# Patient Record
Sex: MALE | Race: WHITE | Employment: OTHER | URBAN - METROPOLITAN AREA
[De-identification: names, ages, dates, MRNs, and addresses within clinical notes are randomized per-mention and may not be internally consistent; named-entity substitution may affect disease eponyms.]

---

## 2017-08-31 ENCOUNTER — OFFICE VISIT (OUTPATIENT)
Dept: FAMILY MEDICINE CLINIC | Age: 42
End: 2017-08-31

## 2017-08-31 VITALS
HEART RATE: 77 BPM | HEIGHT: 72 IN | WEIGHT: 220 LBS | OXYGEN SATURATION: 98 % | TEMPERATURE: 98 F | DIASTOLIC BLOOD PRESSURE: 87 MMHG | BODY MASS INDEX: 29.8 KG/M2 | SYSTOLIC BLOOD PRESSURE: 134 MMHG | RESPIRATION RATE: 14 BRPM

## 2017-08-31 DIAGNOSIS — Z00.00 ANNUAL PHYSICAL EXAM: Primary | ICD-10-CM

## 2017-08-31 DIAGNOSIS — Z12.5 SCREENING FOR PROSTATE CANCER: ICD-10-CM

## 2017-08-31 DIAGNOSIS — R29.810 FACIAL DROOP: ICD-10-CM

## 2017-08-31 NOTE — PROGRESS NOTES
Chief Complaint   Patient presents with    Physical     Patient is here for a physical exam and fasting labs today.    Health Maintenance reviewed

## 2017-08-31 NOTE — PROGRESS NOTES
Og Gtz 42 y.o.. male presents for annual exam.      Screening:   Prostate cancer: desires PSA  Colon cancer: not due, no family history of colon cancer    Chronic issues:   has a past medical history of Allergic rhinitis, cause unspecified (2/19/2010); Elevated blood pressure (not hypertension) (2/19/2010); Eye muscle twitches; Ill-defined condition; Lumbar back pain; Post-concussion syndrome (2/19/2010); and Syncope and collapse (2/19/2010). Facial droop: Patient requests referral to neurology. Has been seen and evaluated at [facility] for facial droop and was receiving Botox injections through neurology; would like to have a [facility] physician take over and perform Botox injections for him. Immunizations:   Flu: declines  TdaP: had in the last year. Diet & Exercise:  Diet: no regular diet. Trying to eat healthily  Exercise: No regular exercise. Up on feet, moving around daily at work. Allergies   Allergen Reactions    Pcn [Penicillins] Rash       Current Outpatient Prescriptions:     valACYclovir (VALTREX) 500 mg tablet, Take 1 Tab by mouth daily. , Disp: 30 Tab, Rfl: 5      ROS  Gen: denies fever, chills, fatigue, weight loss, weight gain  Eyes: denies blurry vision  Nose: denies nasal congestion  Mouth: denies sore throat  Resp: denies dypsnea, cough, wheezing  CV: denies chest pain, palpitations, lower extremity edema  Abd: denies nausea, vomiting, diarrhea, constipation  Neuro: denies numbness/tingling    Visit Vitals    /87 (BP 1 Location: Left arm, BP Patient Position: Sitting)    Pulse 77    Temp 98 °F (36.7 °C) (Oral)    Resp 14    Ht 6' (1.829 m)    Wt 220 lb (99.8 kg)    SpO2 98%    BMI 29.84 kg/m2     Gen: alert, oriented, no acute distress  Head: NCAT  Eyes: PERRLA, sclera clear, conjunctiva clear  Nose: normal turbinates, no rhinorrhea, no sinus tenderness  Oral: moist mucus membranes, no oral lesions, no pharyngeal inflammation or exudate  Neck: supple, midline trachea, no retractions. Thyroid WNL. Resp: Lungs CTAB, no wheezing, rales or rhonchi  CV: Normal rhythm, normal S1/S2, no m/r/g. No LE edema  Skin: no lesion or rash  Neuro: no facial droop noted. Assessment/ Plan:   Diagnoses and all orders for this visit:    1. Annual physical exam  -     CA HANDLG&/OR CONVEY OF SPEC FOR TR OFFICE TO LAB  -     CA COLLECTION VENOUS BLOOD,VENIPUNCTURE  -     LIPID PANEL  -     METABOLIC PANEL, COMPREHENSIVE  -     CBC WITH AUTOMATED DIFF    2. Facial droop  -     REFERRAL TO NEUROLOGY    3. Screening for prostate cancer  -     PSA TOTAL (REFLEX TO FREE)        Healthy balanced diet with fruits, vegetables, lean meats and low in carbohydrates, saturated fats, processed foods recommended. Recommended 30 minutes cardiovascular exercise such as brisk walking/running at leas 3-5x a week. Health Maintenance reviewed - labs ordered. Verbal and written instructions (see AVS) provided.  Patient expresses understanding of diagnosis and treatment plan. Neymar Julio.  Brenda Reina MD

## 2017-09-01 LAB
ALBUMIN SERPL-MCNC: 4.2 G/DL (ref 3.5–5.5)
ALBUMIN/GLOB SERPL: 1.6 {RATIO} (ref 1.2–2.2)
ALP SERPL-CCNC: 56 IU/L (ref 39–117)
ALT SERPL-CCNC: 14 IU/L (ref 0–44)
AST SERPL-CCNC: 13 IU/L (ref 0–40)
BASOPHILS # BLD AUTO: 0 X10E3/UL (ref 0–0.2)
BASOPHILS NFR BLD AUTO: 1 %
BILIRUB SERPL-MCNC: 0.4 MG/DL (ref 0–1.2)
BUN SERPL-MCNC: 14 MG/DL (ref 6–24)
BUN/CREAT SERPL: 17 (ref 9–20)
CALCIUM SERPL-MCNC: 9.6 MG/DL (ref 8.7–10.2)
CHLORIDE SERPL-SCNC: 100 MMOL/L (ref 96–106)
CHOLEST SERPL-MCNC: 158 MG/DL (ref 100–199)
CO2 SERPL-SCNC: 26 MMOL/L (ref 18–29)
CREAT SERPL-MCNC: 0.82 MG/DL (ref 0.76–1.27)
EOSINOPHIL # BLD AUTO: 0.1 X10E3/UL (ref 0–0.4)
EOSINOPHIL NFR BLD AUTO: 1 %
ERYTHROCYTE [DISTWIDTH] IN BLOOD BY AUTOMATED COUNT: 13.8 % (ref 12.3–15.4)
GLOBULIN SER CALC-MCNC: 2.6 G/DL (ref 1.5–4.5)
GLUCOSE SERPL-MCNC: 89 MG/DL (ref 65–99)
HCT VFR BLD AUTO: 45.1 % (ref 37.5–51)
HDLC SERPL-MCNC: 64 MG/DL
HGB BLD-MCNC: 15 G/DL (ref 12.6–17.7)
IMM GRANULOCYTES # BLD: 0 X10E3/UL (ref 0–0.1)
IMM GRANULOCYTES NFR BLD: 0 %
INTERPRETATION, 910389: NORMAL
LDLC SERPL CALC-MCNC: 82 MG/DL (ref 0–99)
LYMPHOCYTES # BLD AUTO: 1.9 X10E3/UL (ref 0.7–3.1)
LYMPHOCYTES NFR BLD AUTO: 34 %
MCH RBC QN AUTO: 29.1 PG (ref 26.6–33)
MCHC RBC AUTO-ENTMCNC: 33.3 G/DL (ref 31.5–35.7)
MCV RBC AUTO: 88 FL (ref 79–97)
MONOCYTES # BLD AUTO: 0.7 X10E3/UL (ref 0.1–0.9)
MONOCYTES NFR BLD AUTO: 12 %
NEUTROPHILS # BLD AUTO: 3 X10E3/UL (ref 1.4–7)
NEUTROPHILS NFR BLD AUTO: 52 %
PLATELET # BLD AUTO: 257 X10E3/UL (ref 150–379)
POTASSIUM SERPL-SCNC: 4.9 MMOL/L (ref 3.5–5.2)
PROT SERPL-MCNC: 6.8 G/DL (ref 6–8.5)
PSA SERPL-MCNC: 0.3 NG/ML (ref 0–4)
RBC # BLD AUTO: 5.15 X10E6/UL (ref 4.14–5.8)
REFLEX CRITERIA: NORMAL
SODIUM SERPL-SCNC: 140 MMOL/L (ref 134–144)
TRIGL SERPL-MCNC: 62 MG/DL (ref 0–149)
VLDLC SERPL CALC-MCNC: 12 MG/DL (ref 5–40)
WBC # BLD AUTO: 5.6 X10E3/UL (ref 3.4–10.8)

## 2017-09-06 ENCOUNTER — TELEPHONE (OUTPATIENT)
Dept: FAMILY MEDICINE CLINIC | Age: 42
End: 2017-09-06

## 2017-09-12 ENCOUNTER — APPOINTMENT (OUTPATIENT)
Dept: GENERAL RADIOLOGY | Age: 42
End: 2017-09-12
Attending: EMERGENCY MEDICINE
Payer: OTHER MISCELLANEOUS

## 2017-09-12 ENCOUNTER — APPOINTMENT (OUTPATIENT)
Dept: CT IMAGING | Age: 42
End: 2017-09-12
Attending: EMERGENCY MEDICINE
Payer: OTHER MISCELLANEOUS

## 2017-09-12 ENCOUNTER — HOSPITAL ENCOUNTER (EMERGENCY)
Age: 42
Discharge: HOME | End: 2017-09-12
Attending: EMERGENCY MEDICINE
Payer: OTHER MISCELLANEOUS

## 2017-09-12 VITALS
SYSTOLIC BLOOD PRESSURE: 153 MMHG | WEIGHT: 220.24 LBS | RESPIRATION RATE: 16 BRPM | HEIGHT: 72 IN | DIASTOLIC BLOOD PRESSURE: 108 MMHG | OXYGEN SATURATION: 92 % | BODY MASS INDEX: 29.83 KG/M2

## 2017-09-12 DIAGNOSIS — S06.0X0A CONCUSSION, WITHOUT LOC, INITIAL ENCOUNTER: Primary | ICD-10-CM

## 2017-09-12 DIAGNOSIS — M54.50 ACUTE MIDLINE LOW BACK PAIN WITHOUT SCIATICA: ICD-10-CM

## 2017-09-12 DIAGNOSIS — S01.01XA SCALP LACERATION, INITIAL ENCOUNTER: ICD-10-CM
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
## 2017-09-12 PROCEDURE — 72100 X-RAY EXAM L-S SPINE 2/3 VWS: CPT

## 2017-09-12 PROCEDURE — 70450 CT HEAD/BRAIN W/O DYE: CPT

## 2017-09-12 PROCEDURE — 74011250637 HC RX REV CODE- 250/637: Performed by: PHYSICIAN ASSISTANT

## 2017-09-12 PROCEDURE — 99283 EMERGENCY DEPT VISIT LOW MDM: CPT

## 2017-09-12 RX ORDER — OXYCODONE AND ACETAMINOPHEN 5; 325 MG/1; MG/1
1 TABLET ORAL
Qty: 15 TAB | Refills: 0 | Status: SHIPPED | OUTPATIENT
Start: 2017-09-12 | End: 2017-09-15

## 2017-09-12 RX ORDER — IBUPROFEN 600 MG/1
600 TABLET ORAL
Status: COMPLETED | OUTPATIENT
Start: 2017-09-12 | End: 2017-09-12

## 2017-09-12 RX ORDER — CYCLOBENZAPRINE HCL 10 MG
10 TABLET ORAL
Qty: 20 TAB | Refills: 0 | Status: SHIPPED | OUTPATIENT
Start: 2017-09-12 | End: 2018-10-22 | Stop reason: ALTCHOICE

## 2017-09-12 RX ORDER — DIAZEPAM 5 MG/1
5 TABLET ORAL
Status: COMPLETED | OUTPATIENT
Start: 2017-09-12 | End: 2017-09-12

## 2017-09-12 RX ORDER — IBUPROFEN 600 MG/1
600 TABLET ORAL
Qty: 20 TAB | Refills: 0 | Status: SHIPPED | OUTPATIENT
Start: 2017-09-12 | End: 2018-10-22 | Stop reason: ALTCHOICE

## 2017-09-12 RX ORDER — OXYCODONE AND ACETAMINOPHEN 5; 325 MG/1; MG/1
1 TABLET ORAL
Status: COMPLETED | OUTPATIENT
Start: 2017-09-12 | End: 2017-09-12

## 2017-09-12 RX ADMIN — DIAZEPAM 5 MG: 5 TABLET ORAL at 18:58

## 2017-09-12 RX ADMIN — OXYCODONE AND ACETAMINOPHEN 1 TABLET: 5; 325 TABLET ORAL at 18:58

## 2017-09-12 RX ADMIN — IBUPROFEN 600 MG: 600 TABLET, FILM COATED ORAL at 18:58

## 2017-09-12 NOTE — ED PROVIDER NOTES
HPI Comments: Everette Rodriges, 42 y.o. male with significant PMHx for HTN, presents ambulatory to ED [facility] ED with cc of back pain, headache, and head laceration secondary to a fall that occurred earlier this afternoon. Pt states he is a  and fell from a metal car lift at 3 feet. Pt notes his head hit the car lift beside him first, then he proceeded to fall to the ground. Pt denies any LOC. Pt's head laceration had controlled bleeding PTA. Pt states he has lower back pain and a slight headache at this time. Of note, pt had a herniated disc several years ago and received cortisol treatments for a time. Pt has not taken any medications for pain. Patient specifically denies nausea, vomiting, diarrhea, abdominal pain, chest pain, SOB, neck pain, urinary or fecal incontinence, numbness, lightheadedness, or dizziness. PCP: Faustino Pena MD    Social history significant for: - Tobacco, - EtOH, - Illicit Drug Use    There are no other complaints, changes, or physical findings at this time. The history is provided by the patient. No  was used. Past Medical History:   Diagnosis Date    Allergic rhinitis, cause unspecified 2/19/2010    Elevated blood pressure (not hypertension) 2/19/2010    Eye muscle twitches     right eye, seeing specialist    Ill-defined condition     kidney stone    Lumbar back pain     L4-L5 injury, injected    Post-concussion syndrome 2/19/2010    Syncope and collapse 2/19/2010       Past Surgical History:   Procedure Laterality Date    HX OTHER SURGICAL      wisdom teeth extraction    HX SEPTOPLASTY  2016         Family History:   Problem Relation Age of Onset    Diabetes Maternal Uncle     Hypertension Mother        Social History     Social History    Marital status:      Spouse name: N/A    Number of children: N/A    Years of education: N/A     Occupational History    Not on file.      Social History Main Topics    Smoking status: Never Smoker    Smokeless tobacco: Former User      Comment: no vaping    Alcohol use 1.0 oz/week     2 Cans of beer per week    Drug use: No    Sexual activity: Yes     Other Topics Concern    Not on file     Social History Narrative    , 3 children         ALLERGIES: Pcn [penicillins]    Review of Systems   Constitutional: Negative for chills, diaphoresis and fever. HENT: Negative for rhinorrhea and sore throat. Eyes: Negative for pain. Respiratory: Negative for shortness of breath, wheezing and stridor. Cardiovascular: Negative for chest pain and leg swelling. Gastrointestinal: Negative for abdominal pain, diarrhea, nausea and vomiting. Genitourinary: Negative for difficulty urinating, dysuria, flank pain and hematuria. Musculoskeletal: Positive for back pain. Negative for neck pain and neck stiffness. Skin: Positive for wound. Negative for rash. Neurological: Positive for headaches. Negative for dizziness, seizures, syncope, weakness and light-headedness. Psychiatric/Behavioral: Negative for behavioral problems and confusion. Vitals:    09/12/17 1622   BP: (!) 153/108   Resp: 16   SpO2: 92%   Weight: 99.9 kg (220 lb 3.8 oz)   Height: 6' (1.829 m)            Physical Exam   Constitutional: He is oriented to person, place, and time. He appears well-developed and well-nourished. No distress. Patient is a  M, awake and alert in NAD. HENT:   Head: Normocephalic. Right Ear: External ear normal.   Left Ear: External ear normal.   Nose: Nose normal.   2mm laceration to occipital scalp. Scant active bleeding. No surrounding erythema or edema. No crepitus with palpation. Eyes: Conjunctivae and EOM are normal. Pupils are equal, round, and reactive to light. Right eye exhibits no discharge. Left eye exhibits no discharge. No scleral icterus. Neck: Normal range of motion. Neck supple. Cardiovascular: Normal rate, regular rhythm and normal heart sounds.     No murmur heard.  Pulmonary/Chest: Effort normal and breath sounds normal. No stridor. No respiratory distress. He has no decreased breath sounds. He has no wheezes. Abdominal: Soft. Bowel sounds are normal. He exhibits no distension. There is no tenderness. There is no rebound. Musculoskeletal: He exhibits tenderness. He exhibits no edema. Spine: No edema, erythema, step offs, or obvious bony deformity. Lower lumbar tenderness   Neurological: He is alert and oriented to person, place, and time. Coordination normal. GCS eye subscore is 4. GCS verbal subscore is 5. GCS motor subscore is 6. No focal neuro deficits. Neuro of upper extremities intact b/l. Neuro of  Lower extremities intact b/l. Sensation of upper and lower extremities intact b/l. Strength 5/5 of upper extremities b/l. Strength 5/5 of lower extremities b/l. Neg Romberg, FTN intact. Ambulatory with steady gait, without difficulty or assistance. No foot drop appreciated. Skin: Skin is warm and dry. No rash noted. He is not diaphoretic.   1 mm laceration to posterior head  CR < 2 seconds  NVI with intact distal sensation  No pulsatile flow, pallor, or edema noted  No active bleeding    Psychiatric: He has a normal mood and affect. Judgment normal.   Nursing note and vitals reviewed. MDM  Number of Diagnoses or Management Options  Acute midline low back pain without sciatica:   Concussion, without LOC, initial encounter:   Fall, initial encounter:   Scalp laceration, initial encounter:   Diagnosis management comments:   DDx: concussion, closed head injury, laceration    strain, sprain, fracture, contusion, acute exacerbation of chronic low back pain    fall       Amount and/or Complexity of Data Reviewed  Tests in the radiology section of CPT®: ordered and reviewed  Review and summarize past medical records: yes    Patient Progress  Patient progress: stable    ED Course       Procedures    Progress Note:  6:49 PM  Updated on CT results.  XR still pending. Discussed head injury precautions with patient. Pt will be monitored by wife at home. Written by Jessika Cohn, ED Scribe, as dictated by Balbir Douglass PA-C     Progress Note:  7:00 PM  Pt declines a staple due to size of the laceration. There is a scant amount of blood, well approximated. Written by Jessika Cohn, ED Scribe, as dictated by Balbir Douglass PA-C    7:02PM  Provider re-evaluated pt. Provider discussed all available diagnostics, diagnosis, and treatment plan. Thoroughly discussed worrisome signs/symptoms in which pt should immediately return to ED, otherwise follow up with PCP, ortho, and/or neuro. Patient conveys understanding and agreement to all of the above. All patient's questions were answered by provider. Patient ambulatory out of ED. JUANCARLOS Lee      LABORATORY TESTS:  No results found for this or any previous visit (from the past 12 hour(s)). IMAGING RESULTS:  XR SPINE LUMB 2 OR 3 V   Final Result   Lumbar. 9/12/2017 6:37 PM     INDICATION: fall from a lift, roughly 4-5 feet, landed flat on back lumbar and  buttock pain.     COMPARISON: None.     FINDINGS:  Frontal, lateral, and lumbosacral radiographs. Lumbar alignment and vertebral  body heights are normal. Incidental note is made of vestigial L1 ribs. Degenerative disc disease is minimal. L5-S1 facet osteoarthritis is mild.     IMPRESSION  IMPRESSION:  Normal alignment and vertebral body heights. CT HEAD WO CONT   Final Result   HEAD CT WITHOUT CONTRAST: 9/12/2017 5:39 PM     INDICATION: Head injury mild or moderate acute, no neurological deficit. Alley Ward  off a lift, hitting head on the side. Posterior head laceration.     COMPARISON: None.     PROCEDURE: Axial images of the head were obtained without contrast. Coronal and  sagittal reformats were performed.  CT dose reduction was achieved through use of  a standardized protocol tailored for this examination and automatic exposure  control for dose modulation.     FINDINGS: The ventricles and sulci are appropriate in size and configuration for  age. No loss of gray-white differentiation to suggest late acute or early  subacute infarction. No mass effect or intracranial hemorrhage.     IMPRESSION  IMPRESSION: No acute intracranial abnormality. MEDICATIONS GIVEN:  Medications   oxyCODONE-acetaminophen (PERCOCET) 5-325 mg per tablet 1 Tab (1 Tab Oral Given 9/12/17 1858)   diazePAM (VALIUM) tablet 5 mg (5 mg Oral Given 9/12/17 1858)   ibuprofen (MOTRIN) tablet 600 mg (600 mg Oral Given 9/12/17 1858)       IMPRESSION:  1. Concussion, without LOC, initial encounter    2. Acute midline low back pain without sciatica    3. Scalp laceration, initial encounter    4. Fall, initial encounter        PLAN:  1. Current Discharge Medication List      START taking these medications    Details   cyclobenzaprine (FLEXERIL) 10 mg tablet Take 1 Tab by mouth three (3) times daily as needed for Muscle Spasm(s). Qty: 20 Tab, Refills: 0      oxyCODONE-acetaminophen (PERCOCET) 5-325 mg per tablet Take 1 Tab by mouth every six (6) hours as needed for Pain for up to 3 days. Max Daily Amount: 4 Tabs. Qty: 15 Tab, Refills: 0      ibuprofen (MOTRIN) 600 mg tablet Take 1 Tab by mouth every six (6) hours as needed for Pain. Qty: 20 Tab, Refills: 0           2.    Follow-up Information     Follow up With Details Comments Contact Info    Kathrin Coello MD Schedule an appointment as soon as possible for a visit in 1 day  [address]  [address]      Dallas Hernandez MD Schedule an appointment as soon as possible for a visit in 4 days  [address] Leroy Ocasio MD Schedule an appointment as soon as possible for a visit in 4 days As needed [address]   MOB [address] [facility]  [phone]      [facility] EMERGENCY DEPT  As needed or, If symptoms worsen [address] [facility] [place]  [phone]        Return to ED if worse     Discharge Note:  7:02 PM   The pt is ready for discharge. The pt's signs, symptoms, diagnosis, and discharge instructions have been discussed and pt has conveyed their understanding. The pt is to follow up as recommended or return to ER should their symptoms worsen. Plan has been discussed and pt is in agreement. This note is prepared by [facility], acting as a Scribe for Juan Antonio Reyes PA-C. Juan Antonio Reyes PA-C: The scribe's documentation has been prepared under my direction and personally reviewed by me in its entirety. I confirm that the notes above accurately reflects all work, treatment, procedures, and medical decision making performed by me. This note will not be viewable in [address]

## 2017-09-12 NOTE — ED TRIAGE NOTES
Patient reports that his fall was from about 4-5 feet and he landed flat on his back. Denies neck pain, non-tender to palpation.

## 2017-09-12 NOTE — DISCHARGE INSTRUCTIONS
Concussion: Care Instructions  Your Care Instructions    A concussion is a kind of injury to the brain. It happens when the head receives a hard blow. The impact can jar or shake the brain against the skull. This interrupts the brain's normal activities. Although you may have cuts or bruises on your head or face, you may have no other visible signs of a brain injury. In most cases, damage to the brain from a concussion can't be seen in tests such as a CT or MRI scan. For a few weeks, you may have low energy, dizziness, trouble sleeping, a headache, ringing in your ears, or nausea. You may also feel anxious, grumpy, or depressed. You may have problems with memory and concentration. These symptoms are common after a concussion. They should slowly improve over time. Sometimes this takes weeks or even months. Someone who lives with you should know how to care for you. Please share this and all information with a caregiver who will be available to help if needed. Follow-up care is a key part of your treatment and safety. Be sure to make and go to all appointments, and call your doctor if you are having problems. It's also a good idea to know your test results and keep a list of the medicines you take. How can you care for yourself at home? Pain control  · Put ice or a cold pack on the part of your head that hurts for 10 to 20 minutes at a time. Put a thin cloth between the ice and your skin. · Be safe with medicines. Read and follow all instructions on the label. ¨ If the doctor gave you a prescription medicine for pain, take it as prescribed. ¨ If you are not taking a prescription pain medicine, ask your doctor if you can take an over-the-counter medicine. Recovery  · Follow your doctor's instructions. He or she will tell you if you need someone to watch you closely for the next 24 hours or longer. · Rest is the best way to recover from a concussion.  You need to rest your body and your brain:  ¨ Get plenty of sleep at night. And take rest breaks during the day. ¨ Avoid activities that take a lot of physical or mental work. This includes housework, exercise, schoolwork, video games, text messaging, and using the computer. ¨ You may need to change your school or work schedule while you recover. ¨ Return to your normal activities slowly. Do not try to do too much at once. · Do not drink alcohol or use illegal drugs. Alcohol and illegal drugs can slow your recovery. And they can increase your risk of a second brain injury. · Avoid activities that could lead to another concussion. Follow your doctor's instructions for a gradual return to activity and sports. · Ask your doctor when it's okay for you to drive a car, ride a bike, or operate machinery. How should you return to activity? Your return to sports or activity should be gradual. It should only begin when all symptoms of a concussion are gone, both while at rest and during exercise or exertion. Doctors and concussion specialists suggest steps to follow for returning to sports after a concussion. Use these steps as a guide. In most places, your doctor must give you written permission for your child to begin the steps and return to sports. You should slowly progress through the following levels of activity:  1. No activity. This means complete physical and mental rest.  2. Light aerobic activity. This can include walking, swimming, or other exercise at less than 70% of maximum heart rate. No resistance training is included in this step. 3. Sport-specific exercise. This includes running drills or skating drills (depending on the sport), but no head impact. 4. Noncontact training drills. This includes more complex training drills such as passing. The athlete may also begin light resistance training. 5. Full-contact practice. The athlete can participate in normal training. 6. Return to normal game play.  This is the final step and allows the athlete to join in normal game play. Watch and keep track of your progress. It should take at least 6 days for you to go from light activity to normal game play. Make sure that you can stay at each new level of activity for at least 24 hours without symptoms, or as long as your doctor says, before doing more. If one or more symptoms come back, return to a lower level of activity for at least 24 hours. Don't move on until all symptoms are gone. When should you call for help? Call 911 anytime you think you may need emergency care. For example, call if:  · You have a seizure. · You passed out (lost consciousness). · You are confused or can't stay awake. Call your doctor now or seek immediate medical care if:  · You have new or worse vomiting. · You feel less alert. · You have new weakness or numbness in any part of your body. Watch closely for changes in your health, and be sure to contact your doctor if:  · You do not get better as expected. · You have new symptoms, such as headaches, trouble concentrating, or changes in mood. Where can you learn more? Go to http://lavon-lonny.info/. Enter Q395 in the search box to learn more about \"Concussion: Care Instructions. \"  Current as of: September 20, 2016  Content Version: 11.3  © 8678-5747 [employer]. Care instructions adapted under license by [employer] (which disclaims liability or warranty for this information). If you have questions about a medical condition or this instruction, always ask your healthcare professional. [place] [address] any warranty or liability for your use of this information. Back Pain: Care Instructions  Your Care Instructions    Back pain has many possible causes. It is often related to problems with muscles and ligaments of the back. It may also be related to problems with the nerves, discs, or bones of the back.  Moving, lifting, standing, sitting, or sleeping in an awkward way can strain the back. Sometimes you don't notice the injury until later. Arthritis is another common cause of back pain. Although it may hurt a lot, back pain usually improves on its own within several weeks. Most people recover in 12 weeks or less. Using good home treatment and being careful not to stress your back can help you feel better sooner. Follow-up care is a key part of your treatment and safety. Be sure to make and go to all appointments, and call your doctor if you are having problems. Its also a good idea to know your test results and keep a list of the medicines you take. How can you care for yourself at home? · Sit or lie in positions that are most comfortable and reduce your pain. Try one of these positions when you lie down:  ¨ Lie on your back with your knees bent and supported by large pillows. ¨ Lie on the floor with your legs on the seat of a sofa or chair. Rudolpho  on your side with your knees and hips bent and a pillow between your legs. ¨ Lie on your stomach if it does not make pain worse. · Do not sit up in bed, and avoid soft couches and twisted positions. Bed rest can help relieve pain at first, but it delays healing. Avoid bed rest after the first day of back pain. · Change positions every 30 minutes. If you must sit for long periods of time, take breaks from sitting. Get up and walk around, or lie in a comfortable position. · Try using a heating pad on a low or medium setting for 15 to 20 minutes every 2 or 3 hours. Try a warm shower in place of one session with the heating pad. · You can also try an ice pack for 10 to 15 minutes every 2 to 3 hours. Put a thin cloth between the ice pack and your skin. · Take pain medicines exactly as directed. ¨ If the doctor gave you a prescription medicine for pain, take it as prescribed. ¨ If you are not taking a prescription pain medicine, ask your doctor if you can take an over-the-counter medicine. · Take short walks several times a day. You can start with 5 to 10 minutes, 3 or 4 times a day, and work up to longer walks. Walk on level surfaces and avoid hills and stairs until your back is better. · Return to work and other activities as soon as you can. Continued rest without activity is usually not good for your back. · To prevent future back pain, do exercises to stretch and strengthen your back and stomach. Learn how to use good posture, safe lifting techniques, and proper body mechanics. When should you call for help? Call your doctor now or seek immediate medical care if:  · You have new or worsening numbness in your legs. · You have new or worsening weakness in your legs. (This could make it hard to stand up.)  · You lose control of your bladder or bowels. Watch closely for changes in your health, and be sure to contact your doctor if:  · Your pain gets worse. · You are not getting better after 2 weeks. Where can you learn more? Go to http://lavon-lonny.info/. Enter W115 in the search box to learn more about \"Back Pain: Care Instructions. \"  Current as of: March 21, 2017  Content Version: 11.3  © 7337-3155 [employer]. Care instructions adapted under license by [employer] (which disclaims liability or warranty for this information). If you have questions about a medical condition or this instruction, always ask your healthcare professional. [address] any warranty or liability for your use of this information.

## 2017-09-12 NOTE — ED TRIAGE NOTES
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 I hit my head on the other lift beside it and it put a good gash on my head and I just don't feel right. \" Patient denies LOC. He also complains of lumbar back pain and buttocks pain. 2x2mm lac to posterior head, bleeding controlled.

## 2017-09-12 NOTE — ED NOTES
The patient was given discharge instructions and questions were answered. Patient is in no apparent distress at time of discharge. Ambulatory with steady gait. Father present to drive him home.

## 2017-10-09 ENCOUNTER — DOCUMENTATION ONLY (OUTPATIENT)
Dept: NEUROLOGY | Age: 42
End: 2017-10-09

## 2017-10-09 ENCOUNTER — OFFICE VISIT (OUTPATIENT)
Dept: NEUROLOGY | Age: 42
End: 2017-10-09

## 2017-10-09 VITALS
HEIGHT: 72 IN | DIASTOLIC BLOOD PRESSURE: 84 MMHG | BODY MASS INDEX: 30.07 KG/M2 | OXYGEN SATURATION: 98 % | HEART RATE: 81 BPM | SYSTOLIC BLOOD PRESSURE: 122 MMHG | WEIGHT: 222 LBS

## 2017-10-09 DIAGNOSIS — G51.39 HEMIFACIAL SPASM: Primary | ICD-10-CM

## 2017-10-09 NOTE — PATIENT INSTRUCTIONS
[facility]   Statement to Patients  April 1, 2014      In an effort to ensure the large volume of patient prescription refills is processed in the most efficient and expeditious manner, we are asking our patients to assist us by calling your Pharmacy for all prescription refills, this will include also your  Mail Order Pharmacy. The pharmacy will contact our office electronically to continue the refill process. Please do not wait until the last minute to call your pharmacy. We need at least 48 hours (2days) to fill prescriptions. We also encourage you to call your pharmacy before going to  your prescription to make sure it is ready. With regard to controlled substance prescription refill requests (narcotic refills) that need to be picked up at our office, we ask your cooperation by providing us with at least 72 hours (3days) notice that you will need a refill. We will not refill narcotic prescription refill requests after 4:00pm on any weekday, Monday through Thursday, or after 2:00pm on Fridays, or on the weekends. We encourage everyone to explore another way of getting your prescription refill request processed using [employer], our patient web portal through our electronic medical record system. [employer] is an efficient and effective way to communicate your medication request directly to the office and  downloadable as an pablo on your smart phone . [employer] also features a review functionality that allows you to view your medication list as well as leave messages for your physician. Are you ready to get connected? If so please review the attatched instructions or speak to any of our staff to get you set up right away! Thank you so much for your cooperation. Should you have any questions please contact our Practice Administrator.     The Physicians and Staff,  [facility]          A Healthy Lifestyle: Care Instructions  Your Care Instructions  A healthy lifestyle can help you feel good, stay at a healthy weight, and have plenty of energy for both work and play. A healthy lifestyle is something you can share with your whole family. A healthy lifestyle also can lower your risk for serious health problems, such as high blood pressure, heart disease, and diabetes. You can follow a few steps listed below to improve your health and the health of your family. Follow-up care is a key part of your treatment and safety. Be sure to make and go to all appointments, and call your doctor if you are having problems. Its also a good idea to know your test results and keep a list of the medicines you take. How can you care for yourself at home? · Do not eat too much sugar, fat, or fast foods. You can still have dessert and treats now and then. The goal is moderation. · Start small to improve your eating habits. Pay attention to portion sizes, drink less juice and soda pop, and eat more fruits and vegetables. ¨ Eat a healthy amount of food. A 3-ounce serving of meat, for example, is about the size of a deck of cards. Fill the rest of your plate with vegetables and whole grains. ¨ Limit the amount of soda and sports drinks you have every day. Drink more water when you are thirsty. ¨ Eat at least 5 servings of fruits and vegetables every day. It may seem like a lot, but it is not hard to reach this goal. A serving or helping is 1 piece of fruit, 1 cup of vegetables, or 2 cups of leafy, raw vegetables. Have an apple or some carrot sticks as an afternoon snack instead of a candy bar. Try to have fruits and/or vegetables at every meal.  · Make exercise part of your daily routine. You may want to start with simple activities, such as walking, bicycling, or slow swimming. Try to be active 30 to 60 minutes every day. You do not need to do all 30 to 60 minutes all at once. For example, you can exercise 3 times a day for 10 or 20 minutes.  Moderate exercise is safe for most people, but it is always a good idea to talk to your doctor before starting an exercise program.  · Keep moving. Radha Zayas the lawn, work in the garden, or [employer] Take the stairs instead of the elevator at work. · If you smoke, quit. People who smoke have an increased risk for heart attack, stroke, cancer, and other lung illnesses. Quitting is hard, but there are ways to boost your chance of quitting tobacco for good. ¨ Use nicotine gum, patches, or lozenges. ¨ Ask your doctor about stop-smoking programs and medicines. ¨ Keep trying. In addition to reducing your risk of diseases in the future, you will notice some benefits soon after you stop using tobacco. If you have shortness of breath or asthma symptoms, they will likely get better within a few weeks after you quit. · Limit how much alcohol you drink. Moderate amounts of alcohol (up to 2 drinks a day for men, 1 drink a day for women) are okay. But drinking too much can lead to liver problems, high blood pressure, and other health problems. Family health  If you have a family, there are many things you can do together to improve your health. · Eat meals together as a family as often as possible. · Eat healthy foods. This includes fruits, vegetables, lean meats and dairy, and whole grains. · Include your family in your fitness plan. Most people think of activities such as jogging or tennis as the way to fitness, but there are many ways you and your family can be more active. Anything that makes you breathe hard and gets your heart pumping is exercise. Here are some tips:  ¨ Walk to do errands or to take your child to school or the bus. ¨ Go for a family bike ride after dinner instead of watching TV. Where can you learn more? Go to http://lavon-lonny.info/. Enter C993 in the search box to learn more about \"A Healthy Lifestyle: Care Instructions. \"  Current as of: July 26, 2016  Content Version: 11.3  © 4970-5207 [employer]. Care instructions adapted under license by [employer] (which disclaims liability or warranty for this information). If you have questions about a medical condition or this instruction, always ask your healthcare professional. [address] any warranty or liability for your use of this information.

## 2017-10-09 NOTE — LETTER
10/9/2017 8:58 AM 
 
Patient:  Kain Edgar YOB: 1975 Date of Visit: 10/9/2017 Dear Fawn Leon MD 
[address] [facility], [address] [place] [id number] VIA In Basket 
 : Thank you for referring Mr. Kasia Guerin to me for evaluation/treatment. Below are the relevant portions of my assessment and plan of care. HISTORY OF PRESENT ILLNESS Kain Edgar is a 36 y.o. male. HPI Comments: Kain Edgar is a 43-year-old  male who comes in today with a long history of right hemifacial spasm. He has had Botox injections before for it by a plastic surgeon who apparently injected into the right lower facial area as well. He has seen a neurosurgeon at [facility] who described the procedure to him and warned him off of it as rule as he did not think the problem was that severe. 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 At times when this thing gets worse he cannot sleep. He takes no regular medications he drinks 1-2 beers per week. He has no significant neurological family history. New Patient The history is provided by the patient. This is a chronic problem. Review of Systems HENT: Negative. Eyes: Positive for pain and redness. Respiratory: Negative. Cardiovascular: Negative. Gastrointestinal: Negative. Genitourinary: Negative. Musculoskeletal: Negative. Skin: Negative. Neurological: Negative. Endo/Heme/Allergies: Negative. Psychiatric/Behavioral: Negative. Current Outpatient Prescriptions on File Prior to Visit Medication Sig Dispense Refill  ibuprofen (MOTRIN) 600 mg tablet Take 1 Tab by mouth every six (6) hours as needed for Pain. 20 Tab 0  cyclobenzaprine (FLEXERIL) 10 mg tablet Take 1 Tab by mouth three (3) times daily as needed for Muscle Spasm(s). 20 Tab 0  
 valACYclovir (VALTREX) 500 mg tablet Take 1 Tab by mouth daily. 30 Tab 5 No current facility-administered medications on file prior to visit. Family History Problem Relation Age of Onset  Diabetes Maternal Uncle  Hypertension Mother Social History Substance Use Topics  Smoking status: Never Smoker  Smokeless tobacco: Former User Comment: no vaping  Alcohol use 1.0 oz/week 2 Cans of beer per week Patient Active Problem List  
Diagnosis Code  Allergic rhinitis, cause unspecified J30.9  Facial droop R29.810  
 Hemifacial spasm G51.3 /84  Pulse 81  Ht 6' (1.829 m)  Wt 222 lb (100.7 kg)  SpO2 98%  BMI 30.11 kg/m2 Physical Exam  
Constitutional: He is oriented to person, place, and time. He appears well-developed and well-nourished. HENT:  
Head: Normocephalic and atraumatic. Mouth/Throat: Oropharynx is clear and moist. No oropharyngeal exudate. Eyes: Conjunctivae and EOM are normal. Pupils are equal, round, and reactive to light. Neck: Normal range of motion. Neck supple. No thyromegaly present. Cardiovascular: Normal rate, regular rhythm and normal heart sounds. No murmur heard. Musculoskeletal: Normal range of motion. He exhibits no edema, tenderness or deformity. Lymphadenopathy:  
  He has no cervical adenopathy. Neurological: He is alert and oriented to person, place, and time. He has normal strength and normal reflexes. He displays no atrophy and no tremor. No cranial nerve deficit or sensory deficit. He exhibits normal muscle tone. He displays a negative Romberg sign. Coordination and gait normal. He displays no Babinski's sign on the right side. He displays no Babinski's sign on the left side. Speech language and mentation are normal 
Visual fields are full to confrontation, funduscopic exam reveals flat discs and retinal vasculature are normal. 
He has an obvious right facial nerve to concentrated around the eye in the orbicularis oculus Skin: Skin is warm and dry. No rash noted. No erythema. Psychiatric: He has a normal mood and affect. His behavior is normal. Judgment and thought content normal.  
Vitals reviewed. ASSESSMENT and PLAN 
RIGHT HEMIFACIALSPASM This patient clearly has right hemifacial spasm primarily around the eye with very little or no involvement of lower facial or neck muscles. I am going to refer him to Annabelle Harvey, neurology [facility] for injections in the areas involved, I believe he has more experience injecting for facial spasm than I do. I will leave it up to Dr. Libia Weiss whether he follows up with him follows up with me. If you have questions, please do not hesitate to call me. I look forward to following Mr. Pricilla Pa along with you. Sincerely, Chapo Pearson MD

## 2017-10-09 NOTE — PROGRESS NOTES
HISTORY OF PRESENT ILLNESS  Kain Edgar is a 36 y.o. male. HPI Comments: Kain Edgar is a 23-year-old  male who comes in today with a long history of right hemifacial spasm. He has had Botox injections before for it by a plastic surgeon who apparently injected into the right lower facial area as well. He has seen a neurosurgeon at [facility] who described the procedure to him and warned him off of it as rule as he did not think the problem was that severe. 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 At times when this thing gets worse he cannot sleep. He takes no regular medications he drinks 1-2 beers per week. He has no significant neurological family history. New Patient   The history is provided by the patient. This is a chronic problem. Review of Systems   HENT: Negative. Eyes: Positive for pain and redness. Respiratory: Negative. Cardiovascular: Negative. Gastrointestinal: Negative. Genitourinary: Negative. Musculoskeletal: Negative. Skin: Negative. Neurological: Negative. Endo/Heme/Allergies: Negative. Psychiatric/Behavioral: Negative. Current Outpatient Prescriptions on File Prior to Visit   Medication Sig Dispense Refill    ibuprofen (MOTRIN) 600 mg tablet Take 1 Tab by mouth every six (6) hours as needed for Pain. 20 Tab 0    cyclobenzaprine (FLEXERIL) 10 mg tablet Take 1 Tab by mouth three (3) times daily as needed for Muscle Spasm(s). 20 Tab 0    valACYclovir (VALTREX) 500 mg tablet Take 1 Tab by mouth daily. 30 Tab 5     No current facility-administered medications on file prior to visit.         Family History   Problem Relation Age of Onset    Diabetes Maternal Uncle     Hypertension Mother      Social History   Substance Use Topics    Smoking status: Never Smoker    Smokeless tobacco: Former User      Comment: no vaping    Alcohol use 1.0 oz/week     2 Cans of beer per week     Patient Active Problem List   Diagnosis Code  Allergic rhinitis, cause unspecified J30.9    Facial droop R29.810    Hemifacial spasm G51.3     /84  Pulse 81  Ht 6' (1.829 m)  Wt 222 lb (100.7 kg)  SpO2 98%  BMI 30.11 kg/m2      Physical Exam   Constitutional: He is oriented to person, place, and time. He appears well-developed and well-nourished. HENT:   Head: Normocephalic and atraumatic. Mouth/Throat: Oropharynx is clear and moist. No oropharyngeal exudate. Eyes: Conjunctivae and EOM are normal. Pupils are equal, round, and reactive to light. Neck: Normal range of motion. Neck supple. No thyromegaly present. Cardiovascular: Normal rate, regular rhythm and normal heart sounds. No murmur heard. Musculoskeletal: Normal range of motion. He exhibits no edema, tenderness or deformity. Lymphadenopathy:     He has no cervical adenopathy. Neurological: He is alert and oriented to person, place, and time. He has normal strength and normal reflexes. He displays no atrophy and no tremor. No cranial nerve deficit or sensory deficit. He exhibits normal muscle tone. He displays a negative Romberg sign. Coordination and gait normal. He displays no Babinski's sign on the right side. He displays no Babinski's sign on the left side. Speech language and mentation are normal  Visual fields are full to confrontation, funduscopic exam reveals flat discs and retinal vasculature are normal.  He has an obvious right facial nerve to concentrated around the eye in the orbicularis oculus   Skin: Skin is warm and dry. No rash noted. No erythema. Psychiatric: He has a normal mood and affect. His behavior is normal. Judgment and thought content normal.   Vitals reviewed. ASSESSMENT and PLAN  RIGHT HEMIFACIALSPASM  This patient clearly has right hemifacial spasm primarily around the eye with very little or no involvement of lower facial or neck muscles.   I am going to refer him to Annabelle Harvey, neurology [facility] for injections in the areas involved, I believe he has more experience injecting for facial spasm than I do. I will leave it up to Dr. Chucho Cassidy whether he follows up with him follows up with me. This note will not be viewable in [address]

## 2017-10-09 NOTE — MR AVS SNAPSHOT
Visit Information Date & Time Provider Department Dept. Phone Encounter #  
 10/9/2017  8:00 AM Brayan Zendejas MD [facility] [phone] [id number] Upcoming Health Maintenance Date Due INFLUENZA AGE 9 TO ADULT 8/1/2017 DTaP/Tdap/Td series (2 - Td) 9/27/2020 Allergies as of 10/9/2017  Review Complete On: 10/9/2017 By: Brayan Zendejas MD  
  
 Severity Noted Reaction Type Reactions Pcn [Penicillins]  02/19/2010    Rash Current Immunizations  Reviewed on 9/27/2010 Name Date TDAP Vaccine 9/27/2010 Not reviewed this visit You Were Diagnosed With   
  
 Codes Comments Hemifacial spasm    -  Primary ICD-10-CM: G51.3 ICD-9-CM: 351.8 Vitals BP Pulse Height(growth percentile) Weight(growth percentile) SpO2 BMI  
 122/84 81 6' (1.829 m) 222 lb (100.7 kg) 98% 30.11 kg/m2 Smoking Status Never Smoker Vitals History BMI and BSA Data Body Mass Index Body Surface Area  
 30.11 kg/m 2 2.26 m 2 Preferred Pharmacy Pharmacy Name Phone [id number] [address] [phone] Your Updated Medication List  
  
   
This list is accurate as of: 10/9/17  8:51 AM.  Always use your most recent med list.  
  
  
  
  
 cyclobenzaprine 10 mg tablet Commonly known as:  FLEXERIL Take 1 Tab by mouth three (3) times daily as needed for Muscle Spasm(s). ibuprofen 600 mg tablet Commonly known as:  MOTRIN Take 1 Tab by mouth every six (6) hours as needed for Pain. valACYclovir 500 mg tablet Commonly known as:  VALTREX Take 1 Tab by mouth daily. We Performed the Following REFERRAL TO NEUROLOGY [QIW26 Custom] Comments:  
 Patient has clear history of right hemifacial spasm primarily involving muscles around the right eye, please inject Botox as indicated. Referral Information Referral ID Referred By Referred To  
  
 [id number] Sreedhar Brenner MD   
   [address] [place] [address] [address] Jaja Cristina Phone: [phone] Fax: [phone] Visits Status Start Date End Date 1 New Request 10/9/17 10/9/18 If your referral has a status of pending review or denied, additional information will be sent to support the outcome of this decision. Patient Instructions PRESCRIPTION REFILL POLICY [facility] Statement to Patients April 1, 2014 In an effort to ensure the large volume of patient prescription refills is processed in the most efficient and expeditious manner, we are asking our patients to assist us by calling your Pharmacy for all prescription refills, this will include also your  Mail Order Pharmacy. The pharmacy will contact our office electronically to continue the refill process. Please do not wait until the last minute to call your pharmacy. We need at least 48 hours (2days) to fill prescriptions. We also encourage you to call your pharmacy before going to  your prescription to make sure it is ready. With regard to controlled substance prescription refill requests (narcotic refills) that need to be picked up at our office, we ask your cooperation by providing us with at least 72 hours (3days) notice that you will need a refill. We will not refill narcotic prescription refill requests after 4:00pm on any weekday, Monday through Thursday, or after 2:00pm on Fridays, or on the weekends. We encourage everyone to explore another way of getting your prescription refill request processed using Vontu, our patient web portal through our electronic medical record system. Vontu is an efficient and effective way to communicate your medication request directly to the office and  downloadable as an pablo on your smart phone .  Vontu also features a review functionality that allows you to view your medication list as well as leave messages for your physician. Are you ready to get connected? If so please review the attatched instructions or speak to any of our staff to get you set up right away! Thank you so much for your cooperation. Should you have any questions please contact our Practice Administrator. The Physicians and Staff,  [facility] A Healthy Lifestyle: Care Instructions Your Care Instructions A healthy lifestyle can help you feel good, stay at a healthy weight, and have plenty of energy for both work and play. A healthy lifestyle is something you can share with your whole family. A healthy lifestyle also can lower your risk for serious health problems, such as high blood pressure, heart disease, and diabetes. You can follow a few steps listed below to improve your health and the health of your family. Follow-up care is a key part of your treatment and safety. Be sure to make and go to all appointments, and call your doctor if you are having problems. Its also a good idea to know your test results and keep a list of the medicines you take. How can you care for yourself at home? · Do not eat too much sugar, fat, or fast foods. You can still have dessert and treats now and then. The goal is moderation. · Start small to improve your eating habits. Pay attention to portion sizes, drink less juice and soda pop, and eat more fruits and vegetables. ¨ Eat a healthy amount of food. A 3-ounce serving of meat, for example, is about the size of a deck of cards. Fill the rest of your plate with vegetables and whole grains. ¨ Limit the amount of soda and sports drinks you have every day. Drink more water when you are thirsty. ¨ Eat at least 5 servings of fruits and vegetables every day.  It may seem like a lot, but it is not hard to reach this goal. A serving or helping is 1 piece of fruit, 1 cup of vegetables, or 2 cups of leafy, raw vegetables. Have an apple or some carrot sticks as an afternoon snack instead of a candy bar. Try to have fruits and/or vegetables at every meal. 
· Make exercise part of your daily routine. You may want to start with simple activities, such as walking, bicycling, or slow swimming. Try to be active 30 to 60 minutes every day. You do not need to do all 30 to 60 minutes all at once. For example, you can exercise 3 times a day for 10 or 20 minutes. Moderate exercise is safe for most people, but it is always a good idea to talk to your doctor before starting an exercise program. 
· Keep moving. [place] the lawn, work in the garden, or [employer] Take the stairs instead of the elevator at work. · If you smoke, quit. People who smoke have an increased risk for heart attack, stroke, cancer, and other lung illnesses. Quitting is hard, but there are ways to boost your chance of quitting tobacco for good. ¨ Use nicotine gum, patches, or lozenges. ¨ Ask your doctor about stop-smoking programs and medicines. ¨ Keep trying. In addition to reducing your risk of diseases in the future, you will notice some benefits soon after you stop using tobacco. If you have shortness of breath or asthma symptoms, they will likely get better within a few weeks after you quit. · Limit how much alcohol you drink. Moderate amounts of alcohol (up to 2 drinks a day for men, 1 drink a day for women) are okay. But drinking too much can lead to liver problems, high blood pressure, and other health problems. Family health If you have a family, there are many things you can do together to improve your health. · Eat meals together as a family as often as possible. · Eat healthy foods. This includes fruits, vegetables, lean meats and dairy, and whole grains. · Include your family in your fitness plan.  Most people think of activities such as jogging or tennis as the way to fitness, but there are many ways you and your family can be more active. Anything that makes you breathe hard and gets your heart pumping is exercise. Here are some tips: 
¨ Walk to do errands or to take your child to school or the bus. ¨ Go for a family bike ride after dinner instead of watching TV. Where can you learn more? Go to http://lavon-lonny.info/. Enter Y083 in the search box to learn more about \"A Healthy Lifestyle: Care Instructions. \" Current as of: July 26, 2016 Content Version: 11.3 © 4219-4386 SolePower. Care instructions adapted under license by Stayful (which disclaims liability or warranty for this information). If you have questions about a medical condition or this instruction, always ask your healthcare professional. [address] any warranty or liability for your use of this information. Introducing [facility] & HEALTH SERVICES! Alban Oconnor introduces [employer]ar[employer] patient portal. Now you can access parts of your medical record, email your doctor's office, and request medication refills online. 1. In your internet browser, go to [email or web address] 2. Click on the First Time User? Click Here link in the Sign In box. You will see the New Member Sign Up page. 3. Enter your Eldarion Access Code exactly as it appears below. You will not need to use this code after youve completed the sign-up process. If you do not sign up before the expiration date, you must request a new code. · Eldarion Access Code: [email or web address] Expires: 11/29/2017  7:46 AM 
 
4. Enter the last four digits of your Social Security Number (xxxx) and Date of Birth (mm/dd/yyyy) as indicated and click Submit. You will be taken to the next sign-up page. 5. Create a Eldarion ID.  This will be your Eldarion login ID and cannot be changed, so think of one that is secure and easy to remember. 6. Create a LoopIt password. You can change your password at any time. 7. Enter your Password Reset Question and Answer. This can be used at a later time if you forget your password. 8. Enter your e-mail address. You will receive e-mail notification when new information is available in [address] 9. Click Sign Up. You can now view and download portions of your medical record. 10. Click the Download Summary menu link to download a portable copy of your medical information. If you have questions, please visit the Frequently Asked Questions section of the LoopIt website. Remember, LoopIt is NOT to be used for urgent needs. For medical emergencies, dial 911. Now available from your iPhone and Android! Please provide this summary of care documentation to your next provider. Your primary care clinician is listed as Arlyn Justin. If you have any questions after today's visit, please call [phone].

## 2017-10-30 ENCOUNTER — OFFICE VISIT (OUTPATIENT)
Dept: NEUROLOGY | Age: 42
End: 2017-10-30

## 2017-10-30 VITALS
SYSTOLIC BLOOD PRESSURE: 137 MMHG | RESPIRATION RATE: 18 BRPM | BODY MASS INDEX: 30.07 KG/M2 | WEIGHT: 222 LBS | DIASTOLIC BLOOD PRESSURE: 89 MMHG | HEIGHT: 72 IN | OXYGEN SATURATION: 97 % | HEART RATE: 69 BPM

## 2017-10-30 DIAGNOSIS — G51.39 HEMIFACIAL SPASM: Primary | ICD-10-CM

## 2017-10-30 NOTE — PROGRESS NOTES
Prem Hurley is a 36 y.o. male who recently saw Dr. Jeffery Belcher. He has hemifacial spasm and started experiencing spasms in the right  eyelid muscles about 2 years ago. It appears that he saw plastic surgeon who injected Botox but he ended up developing facial droop after the injection and he did not want to do it again. The Botox did help the eye twitching. He has seen a neurosurgeon as well. He now  wishes to retry botulinum toxin injections. He has had brain imaging including MRI of the brain which was normal, he says. No history of Bell's palsy    EXAM  Exam:  Visit Vitals    /89 (BP 1 Location: Left arm, BP Patient Position: Sitting)    Pulse 69    Resp 18    Ht 6' (1.829 m)    Wt 100.7 kg (222 lb)    SpO2 97%    BMI 30.11 kg/m2     Gen: Alert  CV: RRR  Lungs: non labored breathing  Abd: non distending  Neuro: A&O x 3, no dysarthria or aphasia  CN II-XII: PERRL, EOMI, face symmetric, tongue/palate midline. Spasm noted in the right eyelid muscles. Motor: strength 5/5 all four ext  Sensory: intact to LT  Gait: normal    LABS/ IMAGING  MRI brain negative per patient      ASSESSMENT    ICD-10-CM ICD-9-CM    1. Hemifacial spasm G51.3 351.8 onabotulinumtoxinA (BOTOX) 100 unit injection       PLAN  I agree that he will benefit from botulinum toxin injections.   Would only start with the palpebral part of orbicularis oculi muscle as I do not notice any facial spasm  We will initiate PA in this regard and will schedule him for injections    macular degeneration

## 2017-10-30 NOTE — MR AVS SNAPSHOT
Visit Information Date & Time Provider Department Dept. Phone Encounter #  
 10/30/2017  8:00 AM Celi Diaz MD [facility] at [address] [id number] Upcoming Health Maintenance Date Due INFLUENZA AGE 9 TO ADULT 8/1/2017 DTaP/Tdap/Td series (2 - Td) 9/27/2020 Allergies as of 10/30/2017  Review Complete On: 10/30/2017 By: Celi Diaz MD  
  
 Severity Noted Reaction Type Reactions Pcn [Penicillins]  02/19/2010    Rash Current Immunizations  Reviewed on 9/27/2010 Name Date TDAP Vaccine 9/27/2010 Not reviewed this visit You Were Diagnosed With   
  
 Codes Comments Hemifacial spasm    -  Primary ICD-10-CM: G51.3 ICD-9-CM: 351.8 Vitals BP Pulse Resp Height(growth percentile) Weight(growth percentile) SpO2  
 137/89 (BP 1 Location: Left arm, BP Patient Position: Sitting) 69 18 6' (1.829 m) 222 lb (100.7 kg) 97% BMI Smoking Status 30.11 kg/m2 Never Smoker BMI and BSA Data Body Mass Index Body Surface Area  
 30.11 kg/m 2 2.26 m 2 Preferred Pharmacy Pharmacy Name Phone [id number] [place], [address] [phone] Your Updated Medication List  
  
   
This list is accurate as of: 10/30/17  8:14 AM.  Always use your most recent med list.  
  
  
  
  
 cyclobenzaprine 10 mg tablet Commonly known as:  FLEXERIL Take 1 Tab by mouth three (3) times daily as needed for Muscle Spasm(s). ibuprofen 600 mg tablet Commonly known as:  MOTRIN Take 1 Tab by mouth every six (6) hours as needed for Pain. onabotulinumtoxinA 100 unit injection Commonly known as:  BOTOX  
100 units to right eye muscles q 3 months  
  
 valACYclovir 500 mg tablet Commonly known as:  VALTREX Take 1 Tab by mouth daily. Prescriptions Printed Refills onabotulinumtoxinA (BOTOX) 100 unit injection 2 Si units to right eye muscles q 3 months Class: Print Patient Instructions A Healthy Lifestyle: Care Instructions Your Care Instructions A healthy lifestyle can help you feel good, stay at a healthy weight, and have plenty of energy for both work and play. A healthy lifestyle is something you can share with your whole family. A healthy lifestyle also can lower your risk for serious health problems, such as high blood pressure, heart disease, and diabetes. You can follow a few steps listed below to improve your health and the health of your family. Follow-up care is a key part of your treatment and safety. Be sure to make and go to all appointments, and call your doctor if you are having problems. It's also a good idea to know your test results and keep a list of the medicines you take. How can you care for yourself at home? · Do not eat too much sugar, fat, or fast foods. You can still have dessert and treats now and then. The goal is moderation. · Start small to improve your eating habits. Pay attention to portion sizes, drink less juice and soda pop, and eat more fruits and vegetables. ¨ Eat a healthy amount of food. A 3-ounce serving of meat, for example, is about the size of a deck of cards. Fill the rest of your plate with vegetables and whole grains. ¨ Limit the amount of soda and sports drinks you have every day. Drink more water when you are thirsty. ¨ Eat at least 5 servings of fruits and vegetables every day. It may seem like a lot, but it is not hard to reach this goal. A serving or helping is 1 piece of fruit, 1 cup of vegetables, or 2 cups of leafy, raw vegetables. Have an apple or some carrot sticks as an afternoon snack instead of a candy bar. Try to have fruits and/or vegetables at every meal. 
· Make exercise part of your daily routine.  You may want to start with simple activities, such as walking, bicycling, or slow swimming. Try to be active 30 to 60 minutes every day. You do not need to do all 30 to 60 minutes all at once. For example, you can exercise 3 times a day for 10 or 20 minutes. Moderate exercise is safe for most people, but it is always a good idea to talk to your doctor before starting an exercise program. 
· Keep moving. Jennifer Chaparros the lawn, work in the garden, or [employer]. Take the stairs instead of the elevator at work. · If you smoke, quit. People who smoke have an increased risk for heart attack, stroke, cancer, and other lung illnesses. Quitting is hard, but there are ways to boost your chance of quitting tobacco for good. ¨ Use nicotine gum, patches, or lozenges. ¨ Ask your doctor about stop-smoking programs and medicines. ¨ Keep trying. In addition to reducing your risk of diseases in the future, you will notice some benefits soon after you stop using tobacco. If you have shortness of breath or asthma symptoms, they will likely get better within a few weeks after you quit. · Limit how much alcohol you drink. Moderate amounts of alcohol (up to 2 drinks a day for men, 1 drink a day for women) are okay. But drinking too much can lead to liver problems, high blood pressure, and other health problems. Family health If you have a family, there are many things you can do together to improve your health. · Eat meals together as a family as often as possible. · Eat healthy foods. This includes fruits, vegetables, lean meats and dairy, and whole grains. · Include your family in your fitness plan. Most people think of activities such as jogging or tennis as the way to fitness, but there are many ways you and your family can be more active. Anything that makes you breathe hard and gets your heart pumping is exercise. Here are some tips: 
¨ Walk to do errands or to take your child to school or the bus. ¨ Go for a family bike ride after dinner instead of watching TV. Where can you learn more? Go to http://lavon-lonny.info/. Enter B252 in the search box to learn more about \"A Healthy Lifestyle: Care Instructions. \" Current as of: May 12, 2017 Content Version: 11.4 © 4049-0431 AJ Tech. Care instructions adapted under license by Celletra (which disclaims liability or warranty for this information). If you have questions about a medical condition or this instruction, always ask your healthcare professional. [address] any warranty or liability for your use of this information. Introducing [facility] & HEALTH SERVICES! Holly Metzger introduces [employer] patient portal. Now you can access parts of your medical record, email your doctor's office, and request medication refills online. 1. In your internet browser, go to [email or web address] 2. Click on the First Time User? Click Here link in the Sign In box. You will see the New Member Sign Up page. 3. Enter your [employer] Access Code exactly as it appears below. You will not need to use this code after youve completed the sign-up process. If you do not sign up before the expiration date, you must request a new code. · [employer] Access Code: [email or web address] Expires: 11/29/2017  7:46 AM 
 
4. Enter the last four digits of your Social Security Number (xxxx) and Date of Birth (mm/dd/yyyy) as indicated and click Submit. You will be taken to the next sign-up page. 5. Create a Planet Sushit ID. This will be your [employer] login ID and cannot be changed, so think of one that is secure and easy to remember. 6. Create a [employer] password. You can change your password at any time. 7. Enter your Password Reset Question and Answer. This can be used at a later time if you forget your password. 8. Enter your e-mail address.  You will receive e-mail notification when new information is available in [employer]. 9. Click Sign Up. You can now view and download portions of your medical record. 10. Click the Download Summary menu link to download a portable copy of your medical information. If you have questions, please visit the Frequently Asked Questions section of the [employer] website. Remember, [employer] is NOT to be used for urgent needs. For medical emergencies, dial 911. Now available from your iPhone and Android! Please provide this summary of care documentation to your next provider. Your primary care clinician is listed as Riley Dewey. If you have any questions after today's visit, please call [phone].

## 2017-10-30 NOTE — PATIENT INSTRUCTIONS

## 2017-11-02 ENCOUNTER — TELEPHONE (OUTPATIENT)
Dept: NEUROLOGY | Age: 42
End: 2017-11-02

## 2017-11-02 NOTE — TELEPHONE ENCOUNTER
Received [facility] approval letter regarding Botox [id number]  Auth# [id number]  Effective 11/1/17-11/1/18    Botox prescription faxed to [facility]

## 2017-11-17 ENCOUNTER — TELEPHONE (OUTPATIENT)
Dept: NEUROLOGY | Age: 42
End: 2017-11-17

## 2017-11-17 NOTE — TELEPHONE ENCOUNTER
T/C contact Erick  spoke w/ Rhiannon Grant  Regarding Predetermination status for  Botox Injection 35711  Per Rhiannon Grant the Pre determination is still in a pending status  Request Control # [id number]   Pre determination process takes 15 business days no weekends or holidays are not included     I acknowledge understanding     PA status pending

## 2017-12-11 NOTE — TELEPHONE ENCOUNTER
----- Message from [facility] sent at 12/11/2017  2:37 PM EST -----  Regarding: Dr. Quintana Lashell, mother, is requesting an appt for the pt to get Botox injections. The pt was seen about his eye doing a lot of twitching, and this was recommended. Best contact number is [phone].

## 2017-12-13 ENCOUNTER — TELEPHONE (OUTPATIENT)
Dept: NEUROLOGY | Age: 42
End: 2017-12-13

## 2017-12-13 NOTE — TELEPHONE ENCOUNTER
----- Message from Daniel Alba sent at 12/13/2017 12:27 PM EST -----  Regarding: Dr. Marty Carter  Pt's mother(Iza Goel) returned nurse call. Best contact number [phone].

## 2017-12-13 NOTE — TELEPHONE ENCOUNTER
Spoke with mother and expailned that we were waiting for approval of injections from insurance company. She verbalized understanding.

## 2017-12-13 NOTE — TELEPHONE ENCOUNTER
----- Message from Taryn Lambert sent at 12/13/2017 10:13 AM EST -----  Regarding: Dr. Jimmy Figueroa  Pt's mother(Iza Cox) stated she left a message 2 days ago regarding an appt for the pt for Botox due to eye twitching, but she has never received a call back. Ms. Nagi Cox requested a call back today. Best contact number [phone].

## 2017-12-19 ENCOUNTER — OFFICE VISIT (OUTPATIENT)
Dept: NEUROLOGY | Age: 42
End: 2017-12-19

## 2017-12-19 VITALS
HEART RATE: 70 BPM | WEIGHT: 222 LBS | OXYGEN SATURATION: 98 % | BODY MASS INDEX: 30.07 KG/M2 | SYSTOLIC BLOOD PRESSURE: 110 MMHG | HEIGHT: 72 IN | DIASTOLIC BLOOD PRESSURE: 82 MMHG

## 2017-12-19 DIAGNOSIS — G51.39 HEMIFACIAL SPASM: Primary | ICD-10-CM

## 2017-12-19 NOTE — PATIENT INSTRUCTIONS

## 2017-12-19 NOTE — PROGRESS NOTES
Botox completed    Botox Injection Note       Indication: Hemifacial spasm and blepharospasm. Procedure:   Botox concentration: 100 units in 2 ml of preservative-free normal saline. Following muscles were injected under EMG guidance:    1. Orbicularis oculi, palpebral part, 2.5 units and 3 sites: Total 7.5 units  2. Levator labi superioris alaque nasi: Total 2.5 units  3. Zygomaticus: Total 2.5 units        100 units Botox were reconstituted, 12.5 units injected as above and the remainder was unavoidably wasted.      Patient tolerated procedure well.     _____________________________   Jose Hui M.D.

## 2017-12-19 NOTE — MR AVS SNAPSHOT
Visit Information Date & Time Provider Department Dept. Phone Encounter #  
 12/19/2017  8:00 AM Tod Lozano MD [facility] Neurology [facility] at [address] [id number] Follow-up Instructions Return in about 2 weeks (around 1/2/2018). Upcoming Health Maintenance Date Due Influenza Age 5 to Adult 8/1/2017 DTaP/Tdap/Td series (2 - Td) 9/27/2020 Allergies as of 12/19/2017  Review Complete On: 12/19/2017 By: Tod Lozano MD  
  
 Severity Noted Reaction Type Reactions Pcn [Penicillins]  02/19/2010    Rash Current Immunizations  Reviewed on 9/27/2010 Name Date TDAP Vaccine 9/27/2010 Not reviewed this visit You Were Diagnosed With   
  
 Codes Comments Hemifacial spasm    -  Primary ICD-10-CM: G51.3 ICD-9-CM: 351.8 Vitals BP Pulse Height(growth percentile) Weight(growth percentile) SpO2 BMI  
 110/82 70 6' (1.829 m) 222 lb (100.7 kg) 98% 30.11 kg/m2 Smoking Status Never Smoker Vitals History BMI and BSA Data Body Mass Index Body Surface Area  
 30.11 kg/m 2 2.26 m 2 Preferred Pharmacy Pharmacy Name Phone [id number] [place] [id number] [facility] [phone] Your Updated Medication List  
  
   
This list is accurate as of: 12/19/17  8:36 AM.  Always use your most recent med list.  
  
  
  
  
 cyclobenzaprine 10 mg tablet Commonly known as:  FLEXERIL Take 1 Tab by mouth three (3) times daily as needed for Muscle Spasm(s). ibuprofen 600 mg tablet Commonly known as:  MOTRIN Take 1 Tab by mouth every six (6) hours as needed for Pain. onabotulinumtoxinA 100 unit injection Commonly known as:  BOTOX  
100 units to right eye muscles q 3 months  
  
 valACYclovir 500 mg tablet Commonly known as:  VALTREX Take 1 Tab by mouth daily. Follow-up Instructions Return in about 2 weeks (around 1/2/2018). Patient Instructions A Healthy Lifestyle: Care Instructions Your Care Instructions A healthy lifestyle can help you feel good, stay at a healthy weight, and have plenty of energy for both work and play. A healthy lifestyle is something you can share with your whole family. A healthy lifestyle also can lower your risk for serious health problems, such as high blood pressure, heart disease, and diabetes. You can follow a few steps listed below to improve your health and the health of your family. Follow-up care is a key part of your treatment and safety. Be sure to make and go to all appointments, and call your doctor if you are having problems. It's also a good idea to know your test results and keep a list of the medicines you take. How can you care for yourself at home? · Do not eat too much sugar, fat, or fast foods. You can still have dessert and treats now and then. The goal is moderation. · Start small to improve your eating habits. Pay attention to portion sizes, drink less juice and soda pop, and eat more fruits and vegetables. ¨ Eat a healthy amount of food. A 3-ounce serving of meat, for example, is about the size of a deck of cards. Fill the rest of your plate with vegetables and whole grains. ¨ Limit the amount of soda and sports drinks you have every day. Drink more water when you are thirsty. ¨ Eat at least 5 servings of fruits and vegetables every day. It may seem like a lot, but it is not hard to reach this goal. A serving or helping is 1 piece of fruit, 1 cup of vegetables, or 2 cups of leafy, raw vegetables. Have an apple or some carrot sticks as an afternoon snack instead of a candy bar. Try to have fruits and/or vegetables at every meal. 
· Make exercise part of your daily routine. You may want to start with simple activities, such as walking, bicycling, or slow swimming.  Try to be active 30 to 60 minutes every day. You do not need to do all 30 to 60 minutes all at once. For example, you can exercise 3 times a day for 10 or 20 minutes. Moderate exercise is safe for most people, but it is always a good idea to talk to your doctor before starting an exercise program. 
· Keep moving. Jesse Gut the lawn, work in the garden, or [employer]. Take the stairs instead of the elevator at work. · If you smoke, quit. People who smoke have an increased risk for heart attack, stroke, cancer, and other lung illnesses. Quitting is hard, but there are ways to boost your chance of quitting tobacco for good. ¨ Use nicotine gum, patches, or lozenges. ¨ Ask your doctor about stop-smoking programs and medicines. ¨ Keep trying. In addition to reducing your risk of diseases in the future, you will notice some benefits soon after you stop using tobacco. If you have shortness of breath or asthma symptoms, they will likely get better within a few weeks after you quit. · Limit how much alcohol you drink. Moderate amounts of alcohol (up to 2 drinks a day for men, 1 drink a day for women) are okay. But drinking too much can lead to liver problems, high blood pressure, and other health problems. Family health If you have a family, there are many things you can do together to improve your health. · Eat meals together as a family as often as possible. · Eat healthy foods. This includes fruits, vegetables, lean meats and dairy, and whole grains. · Include your family in your fitness plan. Most people think of activities such as jogging or tennis as the way to fitness, but there are many ways you and your family can be more active. Anything that makes you breathe hard and gets your heart pumping is exercise. Here are some tips: 
¨ Walk to do errands or to take your child to school or the bus. ¨ Go for a family bike ride after dinner instead of watching TV. Where can you learn more? Go to http://lavon-lonny.info/. Enter H272 in the search box to learn more about \"A Healthy Lifestyle: Care Instructions. \" Current as of: May 12, 2017 Content Version: 11.4 © 6424-0038 Healthwise, Incorporated. Care instructions adapted under license by [employer] (which disclaims liability or warranty for this information). If you have questions about a medical condition or this instruction, always ask your healthcare professional. [address] any warranty or liability for your use of this information. Introducing [facility] & HEALTH SERVICES! Isela Hernandez introduces InRiver patient portal. Now you can access parts of your medical record, email your doctor's office, and request medication refills online. 1. In your internet browser, go to [email or web address] 2. Click on the First Time User? Click Here link in the Sign In box. You will see the New Member Sign Up page. 3. Enter your InRiver Access Code exactly as it appears below. You will not need to use this code after youve completed the sign-up process. If you do not sign up before the expiration date, you must request a new code. · InRiver Access Code: [email or web address] Expires: 3/19/2018  7:57 AM 
 
4. Enter the last four digits of your Social Security Number (xxxx) and Date of Birth (mm/dd/yyyy) as indicated and click Submit. You will be taken to the next sign-up page. 5. Create a InRiver ID. This will be your InRiver login ID and cannot be changed, so think of one that is secure and easy to remember. 6. Create a InRiver password. You can change your password at any time. 7. Enter your Password Reset Question and Answer. This can be used at a later time if you forget your password. 8. Enter your e-mail address. You will receive e-mail notification when new information is available in [address] 9. Click Sign Up.  You can now view and download portions of your medical record. 10. Click the Download Summary menu link to download a portable copy of your medical information. If you have questions, please visit the Frequently Asked Questions section of the Tuxebo website. Remember, Tuxebo is NOT to be used for urgent needs. For medical emergencies, dial 911. Now available from your iPhone and Android! Please provide this summary of care documentation to your next provider. Your primary care clinician is listed as Song Naidu. If you have any questions after today's visit, please call [phone].

## 2018-03-29 ENCOUNTER — TELEPHONE (OUTPATIENT)
Dept: NEUROLOGY | Age: 43
End: 2018-03-29

## 2018-03-29 NOTE — TELEPHONE ENCOUNTER
[facility]  Set for 4/5    Spoke with wife about co-payment needed. [facility] to give [facility]. She verbalized understanding.

## 2018-05-09 ENCOUNTER — TELEPHONE (OUTPATIENT)
Dept: NEUROLOGY | Age: 43
End: 2018-05-09

## 2018-05-09 NOTE — TELEPHONE ENCOUNTER
----- Message from Araceli Stevens sent at 5/9/2018  8:57 AM EDT -----  Regarding: Dr. Latha Ibarra   Pt would like to schedule Botox injections. Best contact number is [phone] or [phone].

## 2018-05-24 ENCOUNTER — OFFICE VISIT (OUTPATIENT)
Dept: NEUROLOGY | Age: 43
End: 2018-05-24

## 2018-05-24 VITALS
RESPIRATION RATE: 18 BRPM | SYSTOLIC BLOOD PRESSURE: 138 MMHG | WEIGHT: 222 LBS | DIASTOLIC BLOOD PRESSURE: 84 MMHG | HEIGHT: 72 IN | HEART RATE: 88 BPM | OXYGEN SATURATION: 96 % | BODY MASS INDEX: 30.07 KG/M2

## 2018-05-24 DIAGNOSIS — G51.39 HEMIFACIAL SPASM: Primary | ICD-10-CM

## 2018-05-24 NOTE — PATIENT INSTRUCTIONS

## 2018-05-24 NOTE — PROGRESS NOTES
Botox completed    Botox Injection Note         Indication: Hemifacial spasm and blepharospasm.      Procedure:   Botox concentration: 100 units in 2 ml of preservative-free normal saline. Following muscles were injected under EMG guidance:     1.  R Orbicularis oculi, palpebral part, 2.5 units and 3 sites: Total 7.5 units  2.  R Levator labi superioris alaque nasi: Total 2.5 units  3. R Zygomaticus:  Total 2.5 units  4. R orbicularis oris: Total 2.5 units          100 units Botox were reconstituted, 15 units injected as above and the remainder was unavoidably wasted.      Patient tolerated procedure well.      _____________________________   Pratik Turner M.D.   Bushra Pitts

## 2018-05-24 NOTE — MR AVS SNAPSHOT
[facility] [address] [place] [address] 
[phone] Patient: Jordyn Clifford MRN:  :[id number] Visit Information Date & Time Provider Department Dept. Phone Encounter #  
 2018  8:50 AM [facility] [address] [place] [address] Road [id number] Follow-up Instructions Return in about 3 months (around 2018). Upcoming Health Maintenance Date Due Influenza Age 5 to Adult 2018 DTaP/Tdap/Td series (2 - Td) 2020 Allergies as of 2018  Review Complete On: 2018 By: Tapan Arreola MD  
  
 Severity Noted Reaction Type Reactions Pcn [Penicillins]  2010    Rash Current Immunizations  Reviewed on 2010 Name Date TDAP Vaccine 2010 Not reviewed this visit You Were Diagnosed With   
  
 Codes Comments Hemifacial spasm    -  Primary ICD-10-CM: G51.3 ICD-9-CM: 351.8 Vitals BP Pulse Resp Height(growth percentile) Weight(growth percentile) SpO2  
 138/84 88 18 6' (1.829 m) 222 lb (100.7 kg) 96% BMI Smoking Status 30.11 kg/m2 Never Smoker Vitals History BMI and BSA Data Body Mass Index Body Surface Area  
 30.11 kg/m 2 2.26 m 2 Preferred Pharmacy [facility]acy Name Phone [id number] [address] [phone] Your Updated Medication List  
  
   
This list is accurate as of 18  9:18 AM.  Always use your most recent med list.  
  
  
  
  
 cyclobenzaprine 10 mg tablet Commonly known as:  FLEXERIL Take 1 Tab by mouth three (3) times daily as needed for Muscle Spasm(s). ibuprofen 600 mg tablet Commonly known as:  MOTRIN Take 1 Tab by mouth every six (6) hours as needed for Pain. onabotulinumtoxinA 100 unit injection Commonly known as:  BOTOX 100 units to right eye muscles q 3 months  
  
 valACYclovir 500 mg tablet Commonly known as:  VALTREX Take 1 Tab by mouth daily. Follow-up Instructions Return in about 3 months (around 8/24/2018). Patient Instructions A Healthy Lifestyle: Care Instructions Your Care Instructions A healthy lifestyle can help you feel good, stay at a healthy weight, and have plenty of energy for both work and play. A healthy lifestyle is something you can share with your whole family. A healthy lifestyle also can lower your risk for serious health problems, such as high blood pressure, heart disease, and diabetes. You can follow a few steps listed below to improve your health and the health of your family. Follow-up care is a key part of your treatment and safety. Be sure to make and go to all appointments, and call your doctor if you are having problems. It's also a good idea to know your test results and keep a list of the medicines you take. How can you care for yourself at home? · Do not eat too much sugar, fat, or fast foods. You can still have dessert and treats now and then. The goal is moderation. · Start small to improve your eating habits. Pay attention to portion sizes, drink less juice and soda pop, and eat more fruits and vegetables. ¨ Eat a healthy amount of food. A 3-ounce serving of meat, for example, is about the size of a deck of cards. Fill the rest of your plate with vegetables and whole grains. ¨ Limit the amount of soda and sports drinks you have every day. Drink more water when you are thirsty. ¨ Eat at least 5 servings of fruits and vegetables every day. It may seem like a lot, but it is not hard to reach this goal. A serving or helping is 1 piece of fruit, 1 cup of vegetables, or 2 cups of leafy, raw vegetables. Have an apple or some carrot sticks as an afternoon snack instead of a candy bar.  Try to have fruits and/or vegetables at every meal. 
 · Make exercise part of your daily routine. You may want to start with simple activities, such as walking, bicycling, or slow swimming. Try to be active 30 to 60 minutes every day. You do not need to do all 30 to 60 minutes all at once. For example, you can exercise 3 times a day for 10 or 20 minutes. Moderate exercise is safe for most people, but it is always a good idea to talk to your doctor before starting an exercise program. 
· Keep moving. Tre Blotter the lawn, work in the garden, or [employer]. Take the stairs instead of the elevator at work. · If you smoke, quit. People who smoke have an increased risk for heart attack, stroke, cancer, and other lung illnesses. Quitting is hard, but there are ways to boost your chance of quitting tobacco for good. ¨ Use nicotine gum, patches, or lozenges. ¨ Ask your doctor about stop-smoking programs and medicines. ¨ Keep trying. In addition to reducing your risk of diseases in the future, you will notice some benefits soon after you stop using tobacco. If you have shortness of breath or asthma symptoms, they will likely get better within a few weeks after you quit. · Limit how much alcohol you drink. Moderate amounts of alcohol (up to 2 drinks a day for men, 1 drink a day for women) are okay. But drinking too much can lead to liver problems, high blood pressure, and other health problems. Family health If you have a family, there are many things you can do together to improve your health. · Eat meals together as a family as often as possible. · Eat healthy foods. This includes fruits, vegetables, lean meats and dairy, and whole grains. · Include your family in your fitness plan. Most people think of activities such as jogging or tennis as the way to fitness, but there are many ways you and your family can be more active. Anything that makes you breathe hard and gets your heart pumping is exercise. Here are some tips: ¨ Walk to do errands or to take your child to school or the bus. ¨ Go for a family bike ride after dinner instead of watching TV. Where can you learn more? Go to http://lavon-lonny.info/. Enter O739 in the search box to learn more about \"A Healthy Lifestyle: Care Instructions. \" Current as of: May 12, 2017 Content Version: 11.4 © 4096-8600 Cinemagram. Care instructions adapted under license by [employer] (which disclaims liability or warranty for this information). If you have questions about a medical condition or this instruction, always ask your healthcare professional. [address] any warranty or liability for your use of this information. Introducing [facility] & HEALTH SERVICES! [facility] introduces Biotherapeutics patient portal. Now you can access parts of your medical record, email your doctor's office, and request medication refills online. 1. In your internet browser, go to [email or web address] 2. Click on the First Time User? Click Here link in the Sign In box. You will see the New Member Sign Up page. 3. Enter your Biotherapeutics Access Code exactly as it appears below. You will not need to use this code after youve completed the sign-up process. If you do not sign up before the expiration date, you must request a new code. · Biotherapeutics Access Code: [email or web address] Expires: 8/22/2018  9:18 AM 
 
4. Enter the last four digits of your Social Security Number (xxxx) and Date of Birth (mm/dd/yyyy) as indicated and click Submit. You will be taken to the next sign-up page. 5. Create a VeriTrant ID. This will be your Biotherapeutics login ID and cannot be changed, so think of one that is secure and easy to remember. 6. Create a VeriTrant password. You can change your password at any time. 7. Enter your Password Reset Question and Answer. This can be used at a later time if you forget your password. 8. Enter your e-mail address. You will receive e-mail notification when new information is available in [address] 9. Click Sign Up. You can now view and download portions of your medical record. 10. Click the Download Summary menu link to download a portable copy of your medical information. If you have questions, please visit the Frequently Asked Questions section of the Cardiio website. Remember, Cardiio is NOT to be used for urgent needs. For medical emergencies, dial 911. Now available from your iPhone and Android! Please provide this summary of care documentation to your next provider. Your primary care clinician is listed as Carmen Bustamante. If you have any questions after today's visit, please call [phone].

## 2018-07-19 ENCOUNTER — TELEPHONE (OUTPATIENT)
Dept: NEUROLOGY | Age: 43
End: 2018-07-19

## 2018-07-19 NOTE — TELEPHONE ENCOUNTER
Re: Botox    Per CC note from [facility] on 11/2/17:  \"Received [facility] approval letter regarding Botox   Auth# [id number]  Effective 11/1/17-11/1/18\"    S/w [place] @ [facility] [employer] to check  auth. Per [place] there is a valid/approved auth on file.  approved. Auth # [id number]. Auth good 11/1/17 - 11/1/19.    [id number] and [id number] no PA required for outpatient service per Anastasia Aiken. @ [employer]. [facility] Phone # is [phone]. Forward to nurse. Please schedule shipment.

## 2018-08-22 ENCOUNTER — TELEPHONE (OUTPATIENT)
Dept: NEUROLOGY | Age: 43
End: 2018-08-22

## 2018-08-22 NOTE — TELEPHONE ENCOUNTER
----- Message from [address] [place] [address] sent at 8/22/2018  8:04 AM EDT -----  Regarding: Renetta Hinton. Yo Zacarias / telephone  Patient called to cancel appt today 08/22/18 due to illness. Would like to reschedule for early morning appointment any day. Dx-Botox  Lurene Decent 11/1/17 - 11/1/19. Botox [id number] and [id number] no PAs required. [facility] ph [phone].  Kk\"    Best contact [phone]

## 2018-08-29 ENCOUNTER — OFFICE VISIT (OUTPATIENT)
Dept: NEUROLOGY | Age: 43
End: 2018-08-29

## 2018-08-29 VITALS
HEIGHT: 72 IN | RESPIRATION RATE: 14 BRPM | BODY MASS INDEX: 30.07 KG/M2 | HEART RATE: 70 BPM | DIASTOLIC BLOOD PRESSURE: 80 MMHG | SYSTOLIC BLOOD PRESSURE: 130 MMHG | OXYGEN SATURATION: 97 % | WEIGHT: 222 LBS

## 2018-08-29 DIAGNOSIS — G51.39 HEMIFACIAL SPASM: Primary | ICD-10-CM

## 2018-08-29 DIAGNOSIS — G24.5 BLEPHAROSPASM: ICD-10-CM

## 2018-08-29 NOTE — PATIENT INSTRUCTIONS

## 2018-08-29 NOTE — PROGRESS NOTES
Botox completed Botox Injection Note  
   
   
Indication: Hemifacial spasm and blepharospasm.  
   
Procedure:  
Botox concentration: 100 units in 2 ml of preservative-free normal saline. Following muscles were injected under EMG guidance: 
   
1.  R Orbicularis oculi, palpebral part, 2.5 units each in 4 sites: Total 10 units 2.  R Levator labi superioris alaque nasi: Total 2.5 units 3.  R Zygomaticus: Total 2.5 units 4. R orbicularis oris: Total 2.5 units    
   
100 units Botox were reconstituted, 17.5 units injected as above and the remainder was unavoidably wasted.  
   
Patient tolerated procedure well.  
   
_____________________________ Yvette Gannon M.D.

## 2018-08-29 NOTE — MR AVS SNAPSHOT
[facility] [address] [facility] Yovany Matias 13 
[phone] Patient: Tariq Hardy MRN:  [id number] Visit Information Date & Time Provider Department Dept. Phone Encounter #  
 2018  8:00 AM Fani Valera MD [facility] [id number] Follow-up Instructions Return in about 3 months (around 2018). Your Appointments 2018  8:00 AM  
PROCEDURE with Fani Valera MD  
[facility] [address] [facility]) Appt Note: botox ok per Dr. Lopes Slice [address] [place] [address]  
[phone]  
  
   
 [address] [place] [id number] Upcoming Health Maintenance Date Due Influenza Age 5 to Adult 2018 DTaP/Tdap/Td series (2 - Td) 2020 Allergies as of 2018  Review Complete On: 2018 By: Fani Valera MD  
  
 Severity Noted Reaction Type Reactions Pcn [Penicillins]  2010    Rash Current Immunizations  Reviewed on 2010 Name Date TDAP Vaccine 2010 Not reviewed this visit You Were Diagnosed With   
  
 Codes Comments Hemifacial spasm    -  Primary ICD-10-CM: G51.3 ICD-9-CM: 351.8 Blepharospasm     ICD-10-CM: G24.5 ICD-9-CM: 333.81 Vitals BP Pulse Resp Height(growth percentile) Weight(growth percentile) SpO2  
 130/80 70 14 6' (1.829 m) 222 lb (100.7 kg) 97% BMI Smoking Status 30.11 kg/m2 Never Smoker Vitals History BMI and BSA Data Body Mass Index Body Surface Area  
 30.11 kg/m 2 2.26 m 2 Preferred Pharmacy Pharmacy Name Phone [id number] [place] [id number] [facility] [phone] Your Updated Medication List  
  
   
 This list is accurate as of 8/29/18  8:39 AM.  Always use your most recent med list.  
  
  
  
  
 cyclobenzaprine 10 mg tablet Commonly known as:  FLEXERIL Take 1 Tab by mouth three (3) times daily as needed for Muscle Spasm(s). ibuprofen 600 mg tablet Commonly known as:  MOTRIN Take 1 Tab by mouth every six (6) hours as needed for Pain. onabotulinumtoxinA 100 unit injection Commonly known as:  BOTOX  
100 units to right eye muscles q 3 months  
  
 valACYclovir 500 mg tablet Commonly known as:  VALTREX Take 1 Tab by mouth daily. We Performed the Following [place] CHEMODNRVTJ [facility] INNERVATED FACIAL NRV UNIL [id number] CPT(R)] [place] NEEDLE EMG GUIDANCE FOR CHEMODENERVATION [id number] CPT(R)] Follow-up Instructions Return in about 3 months (around 11/29/2018). Patient Instructions A Healthy Lifestyle: Care Instructions Your Care Instructions A healthy lifestyle can help you feel good, stay at a healthy weight, and have plenty of energy for both work and play. A healthy lifestyle is something you can share with your whole family. A healthy lifestyle also can lower your risk for serious health problems, such as high blood pressure, heart disease, and diabetes. You can follow a few steps listed below to improve your health and the health of your family. Follow-up care is a key part of your treatment and safety. Be sure to make and go to all appointments, and call your doctor if you are having problems. It's also a good idea to know your test results and keep a list of the medicines you take. How can you care for yourself at home? · Do not eat too much sugar, fat, or fast foods. You can still have dessert and treats now and then. The goal is moderation. · Start small to improve your eating habits. Pay attention to portion sizes, drink less juice and soda pop, and eat more fruits and vegetables. ¨ Eat a healthy amount of food. A 3-ounce serving of meat, for example, is about the size of a deck of cards. Fill the rest of your plate with vegetables and whole grains. ¨ Limit the amount of soda and sports drinks you have every day. Drink more water when you are thirsty. ¨ Eat at least 5 servings of fruits and vegetables every day. It may seem like a lot, but it is not hard to reach this goal. A serving or helping is 1 piece of fruit, 1 cup of vegetables, or 2 cups of leafy, raw vegetables. Have an apple or some carrot sticks as an afternoon snack instead of a candy bar. Try to have fruits and/or vegetables at every meal. 
· Make exercise part of your daily routine. You may want to start with simple activities, such as walking, bicycling, or slow swimming. Try to be active 30 to 60 minutes every day. You do not need to do all 30 to 60 minutes all at once. For example, you can exercise 3 times a day for 10 or 20 minutes. Moderate exercise is safe for most people, but it is always a good idea to talk to your doctor before starting an exercise program. 
· Keep moving. Tim Bonds the lawn, work in the garden, or Acceptd. Take the stairs instead of the elevator at work. · If you smoke, quit. People who smoke have an increased risk for heart attack, stroke, cancer, and other lung illnesses. Quitting is hard, but there are ways to boost your chance of quitting tobacco for good. ¨ Use nicotine gum, patches, or lozenges. ¨ Ask your doctor about stop-smoking programs and medicines. ¨ Keep trying. In addition to reducing your risk of diseases in the future, you will notice some benefits soon after you stop using tobacco. If you have shortness of breath or asthma symptoms, they will likely get better within a few weeks after you quit. · Limit how much alcohol you drink. Moderate amounts of alcohol (up to 2 drinks a day for men, 1 drink a day for women) are okay.  But drinking too much can lead to liver problems, high blood pressure, and other health problems. Family health If you have a family, there are many things you can do together to improve your health. · Eat meals together as a family as often as possible. · Eat healthy foods. This includes fruits, vegetables, lean meats and dairy, and whole grains. · Include your family in your fitness plan. Most people think of activities such as jogging or tennis as the way to fitness, but there are many ways you and your family can be more active. Anything that makes you breathe hard and gets your heart pumping is exercise. Here are some tips: 
¨ Walk to do errands or to take your child to school or the bus. ¨ Go for a family bike ride after dinner instead of watching TV. Where can you learn more? Go to http://lavon-lonny.info/. Enter Y436 in the search box to learn more about \"A Healthy Lifestyle: Care Instructions. \" Current as of: December 7, 2017 Content Version: 11.7 © 4884-3398 [employer]wise[employer]. Care instructions adapted under license by [employer] (which disclaims liability or warranty for this information). If you have questions about a medical condition or this instruction, always ask your healthcare professional. [place] [address] any warranty or liability for your use of this information. Introducing [facility]! [employer] introduces [employer] patient portal. Now you can access parts of your medical record, email your doctor's office, and request medication refills online. 1. In your internet browser, go to [email or web address] 2. Click on the First Time User? Click Here link in the Sign In box. You will see the New Member Sign Up page. 3. Enter your ETARGET Access Code exactly as it appears below. You will not need to use this code after youve completed the sign-up process.  If you do not sign up before the expiration date, you must request a new code. · SingleHop Access Code: [email or web address] Expires: 11/27/2018  7:59 AM 
 
4. Enter the last four digits of your Social Security Number (xxxx) and Date of Birth (mm/dd/yyyy) as indicated and click Submit. You will be taken to the next sign-up page. 5. Create a SingleHop ID. This will be your SingleHop login ID and cannot be changed, so think of one that is secure and easy to remember. 6. Create a SingleHop password. You can change your password at any time. 7. Enter your Password Reset Question and Answer. This can be used at a later time if you forget your password. 8. Enter your e-mail address. You will receive e-mail notification when new information is available in [address] 9. Click Sign Up. You can now view and download portions of your medical record. 10. Click the Download Summary menu link to download a portable copy of your medical information. If you have questions, please visit the Frequently Asked Questions section of the SingleHop website. Remember, SingleHop is NOT to be used for urgent needs. For medical emergencies, dial 911. Now available from your iPhone and Android! Please provide this summary of care documentation to your next provider. Your primary care clinician is listed as Jone White. If you have any questions after today's visit, please call [phone].

## 2018-10-22 ENCOUNTER — OFFICE VISIT (OUTPATIENT)
Dept: FAMILY MEDICINE CLINIC | Age: 43
End: 2018-10-22

## 2018-10-22 VITALS
RESPIRATION RATE: 18 BRPM | OXYGEN SATURATION: 98 % | BODY MASS INDEX: 30.75 KG/M2 | SYSTOLIC BLOOD PRESSURE: 138 MMHG | HEART RATE: 67 BPM | TEMPERATURE: 98.3 F | DIASTOLIC BLOOD PRESSURE: 84 MMHG | HEIGHT: 72 IN | WEIGHT: 227 LBS

## 2018-10-22 DIAGNOSIS — R03.0 ELEVATED BP WITHOUT DIAGNOSIS OF HYPERTENSION: ICD-10-CM

## 2018-10-22 DIAGNOSIS — Z12.5 PROSTATE CANCER SCREENING: ICD-10-CM

## 2018-10-22 DIAGNOSIS — R29.810 FACIAL DROOP: ICD-10-CM

## 2018-10-22 DIAGNOSIS — Z00.00 ANNUAL PHYSICAL EXAM: Primary | ICD-10-CM

## 2018-10-22 NOTE — PROGRESS NOTES
39 y.o. Male  presents for a physical.    Patient Active Problem List    Diagnosis    Hemifacial spasm using botox every 4 months which helps    Facial droop     Right sided after botox injection.  Allergic rhinitis, cause unspecified         Past Medical History:   Diagnosis Date    Allergic rhinitis, cause unspecified 2/19/2010    Elevated blood pressure (not hypertension) 2/19/2010    Eye muscle twitches     right eye, seeing specialist    Ill-defined condition     kidney stone    Lumbar back pain     L4-L5 injury, injected    Post-concussion syndrome 2/19/2010    Syncope and collapse 2/19/2010       Past Surgical History:   Procedure Laterality Date    HX OTHER SURGICAL      wisdom teeth extraction    HX SEPTOPLASTY  2016       Family History   Problem Relation Age of Onset    Diabetes Maternal Uncle     Hypertension Mother        Social History     Tobacco Use    Smoking status: Never Smoker    Smokeless tobacco: Former User    Tobacco comment: no vaping   Substance Use Topics    Alcohol use: Yes     Alcohol/week: 1.0 oz     Types: 2 Cans of beer per week    Drug use: No       Social History     Social History Narrative    , 3 children       There are no preventive care reminders to display for this patient.     Outpatient Medications Marked as Taking for the 10/22/18 encounter (Office Visit) with Beatriz Santana MD   Medication Sig Dispense Refill    onabotulinumtoxinA (BOTOX) 100 unit injection 100 units to right eye muscles q 3 months 1 Vial 2       Allergies   Allergen Reactions    Pcn [Penicillins] Rash       Review of Systems:  Gen: no fatigue, fever, chills, weight loss or weight gain  Eyes: no excessive tearing, itching, or discharge  Nose: no rhinorrhea, no sinus pain  Mouth: no oral lesions, no sore throat  Resp: no shortness of breath, no wheezing, no cough  CV: no chest pain, no orthopnea, no paroxysmal nocturnal dyspnea, no lower extremity edema, no palpitations  Abd: no nausea, no heartburn, no diarrhea, no constipation, no abdominal pain  Neuro: no headaches, no syncope or presyncopal episodes  Endo: no polyuria, no polydipsia  Heme: no lymphadenopathy, no easy bruising or bleeding    Visit Vitals  /84   Pulse 67   Temp 98.3 °F (36.8 °C) (Oral)   Resp 18   Ht 6' (1.829 m)   Wt 227 lb (103 kg)   SpO2 98%   BMI 30.79 kg/m²     Gen: alert, oriented, no acute distress  Ears: external auditory canals clear, TMs without erythema or effusion  Eyes: pupils equal round reactive to light, sclera clear, conjunctiva clear  Oral: moist mucus membranes, no oral lesions, no pharyngeal inflammation or exudate  Neck: thyroid symmetric and not enlarged, no carotid bruits, no jugular vein distention  Resp: no increase work of breathing, lungs clear to ausculation bilaterally, no wheezing, rales or rhonchi  CV: S1, S2 normal. No murmurs, rubs, or gallops. Abd: soft, not tender, not distended. No hepatosplenomegaly. Normal bowel sounds. No hernias. Neuro: cranial nerves intact, normal strength and movement in all extremities, reflexes and sensation intact and symmetric. Skin: no lesion or rash  Extremities: no cyanosis or edema    Assessment/Plan:    1. Annual physical exam  Age appropriate Health Maintenance activities reviewed with patient and updated. - CBC WITH AUTOMATED DIFF  - METABOLIC PANEL, COMPREHENSIVE  - LIPID PANEL  - PSA, DIAGNOSTIC (PROSTATE SPECIFIC AG)    2. Prostate cancer screening  Labs today  - PSA, DIAGNOSTIC (PROSTATE SPECIFIC AG)    3. Elevated BP  Discussed lifestyle modification, home monitoring. 4. Facial droop - baseline, not related to HTN.     Health Maintenance reviewed - updated    Orders Placed This Encounter    CBC WITH AUTOMATED DIFF    METABOLIC PANEL, COMPREHENSIVE    LIPID PANEL    PROSTATE SPECIFIC AG       Medications Discontinued During This Encounter   Medication Reason    cyclobenzaprine (FLEXERIL) 10 mg tablet Therapy Completed    ibuprofen (MOTRIN) 600 mg tablet Therapy Completed       Current Outpatient Medications   Medication Sig Dispense Refill    onabotulinumtoxinA (BOTOX) 100 unit injection 100 units to right eye muscles q 3 months 1 Vial 2    valACYclovir (VALTREX) 500 mg tablet Take 1 Tab by mouth daily. 30 Tab 5         Recommended healthy diet low in carbohydrates, fats, sodium and cholesterol. Recommended regular cardiovascular exercise 3-6 times per week for 30-60 minutes daily. Verbal and written instructions (see AVS) provided. Patient expresses understanding of diagnosis and treatment plan.

## 2018-10-22 NOTE — PATIENT INSTRUCTIONS
Elevated Blood Pressure: Care Instructions  Your Care Instructions    Blood pressure is a measure of how hard the blood pushes against the walls of your arteries. It's normal for blood pressure to go up and down throughout the day. But if it stays up over time, you have high blood pressure. Two numbers tell you your blood pressure. The first number is the systolic pressure. It shows how hard the blood pushes when your heart is pumping. The second number is the diastolic pressure. It shows how hard the blood pushes between heartbeats, when your heart is relaxed and filling with blood. An ideal blood pressure in adults is less than 120/80 (say \"120 over 80\"). High blood pressure is 140/90 or higher. You have high blood pressure if your top number is 140 or higher or your bottom number is 90 or higher, or both. The main test for high blood pressure is simple, fast, and painless. To diagnose high blood pressure, your doctor will test your blood pressure at different times. After testing your blood pressure, your doctor may ask you to test it again when you are home. If you are diagnosed with high blood pressure, you can work with your doctor to make a long-term plan to manage it. Follow-up care is a key part of your treatment and safety. Be sure to make and go to all appointments, and call your doctor if you are having problems. It's also a good idea to know your test results and keep a list of the medicines you take. How can you care for yourself at home? · Do not smoke. Smoking increases your risk for heart attack and stroke. If you need help quitting, talk to your doctor about stop-smoking programs and medicines. These can increase your chances of quitting for good. · Stay at a healthy weight. · Try to limit how much sodium you eat to less than 2,300 milligrams (mg) a day. Your doctor may ask you to try to eat less than 1,500 mg a day. · Be physically active.  Get at least 30 minutes of exercise on most days of the week. Walking is a good choice. You also may want to do other activities, such as running, swimming, cycling, or playing tennis or team sports. · Avoid or limit alcohol. Talk to your doctor about whether you can drink any alcohol. · Eat plenty of fruits, vegetables, and low-fat dairy products. Eat less saturated and total fats. · Learn how to check your blood pressure at home. When should you call for help? Call your doctor now or seek immediate medical care if:  ? · Your blood pressure is much higher than normal (such as 180/110 or higher). ? · You think high blood pressure is causing symptoms such as:  ¨ Severe headache. ¨ Blurry vision. ? Watch closely for changes in your health, and be sure to contact your doctor if:  ? · You do not get better as expected. Where can you learn more? Go to http://lavon[employer]ster Data Systemslonny.info/. Enter U487 in the search box to learn more about \"Elevated Blood Pressure: Care Instructions. \"  Current as of: September 21, 2016  Content Version: 11.4  © 6126-2889 [employer]. Care instructions adapted under license by [employer] (which disclaims liability or warranty for this information). If you have questions about a medical condition or this instruction, always ask your healthcare professional. [address] any warranty or liability for your use of this information. Well Visit, Ages 25 to 48: Care Instructions  Your Care Instructions    Physical exams can help you stay healthy. Your doctor has checked your overall health and may have suggested ways to take good care of yourself. He or she also may have recommended tests. At home, you can help prevent illness with healthy eating, regular exercise, and other steps. Follow-up care is a key part of your treatment and safety. Be sure to make and go to all appointments, and call your doctor if you are having problems.  It's also a good idea to know your test results and keep a list of the medicines you take. How can you care for yourself at home? · Reach and stay at a healthy weight. This will lower your risk for many problems, such as obesity, diabetes, heart disease, and high blood pressure. · Get at least 30 minutes of physical activity on most days of the week. Walking is a good choice. You also may want to do other activities, such as running, swimming, cycling, or playing tennis or team sports. Discuss any changes in your exercise program with your doctor. · Do not smoke or allow others to smoke around you. If you need help quitting, talk to your doctor about stop-smoking programs and medicines. These can increase your chances of quitting for good. · Talk to your doctor about whether you have any risk factors for sexually transmitted infections (STIs). Having one sex partner (who does not have STIs and does not have sex with anyone else) is a good way to avoid these infections. · Use birth control if you do not want to have children at this time. Talk with your doctor about the choices available and what might be best for you. · Protect your skin from too much sun. When you're outdoors from 10 a.m. to 4 p.m., stay in the shade or cover up with clothing and a hat with a wide brim. Wear sunglasses that block UV rays. Even when it's cloudy, put broad-spectrum sunscreen (SPF 30 or higher) on any exposed skin. · See a dentist one or two times a year for checkups and to have your teeth cleaned. · Wear a seat belt in the car. · Drink alcohol in moderation, if at all. That means no more than 2 drinks a day for men and 1 drink a day for women. Follow your doctor's advice about when to have certain tests. These tests can spot problems early. For everyone  · Cholesterol. Have the fat (cholesterol) in your blood tested after age 21.  Your doctor will tell you how often to have this done based on your age, family history, or other things that can increase your risk for heart disease. · Blood pressure. Have your blood pressure checked during a routine doctor visit. Your doctor will tell you how often to check your blood pressure based on your age, your blood pressure results, and other factors. · Vision. Talk with your doctor about how often to have a glaucoma test.  · Diabetes. Ask your doctor whether you should have tests for diabetes. · Colon cancer. Have a test for colon cancer at age 48. You may have one of several tests. If you are younger than 48, you may need a test earlier if you have any risk factors. Risk factors include whether you already had a precancerous polyp removed from your colon or whether your parent, brother, sister, or child has had colon cancer. For women  · Breast exam and mammogram. Talk to your doctor about when you should have a clinical breast exam and a mammogram. Medical experts differ on whether and how often women under 50 should have these tests. Your doctor can help you decide what is right for you. · Pap test and pelvic exam. Begin Pap tests at age 24. A Pap test is the best way to find cervical cancer. The test often is part of a pelvic exam. Ask how often to have this test.  · Tests for sexually transmitted infections (STIs). Ask whether you should have tests for STIs. You may be at risk if you have sex with more than one person, especially if your partners do not wear condoms. For men  · Tests for sexually transmitted infections (STIs). Ask whether you should have tests for STIs. You may be at risk if you have sex with more than one person, especially if you do not wear a condom. · Testicular cancer exam. Ask your doctor whether you should check your testicles regularly. · Prostate exam. Talk to your doctor about whether you should have a blood test (called a PSA test) for prostate cancer.  Experts differ on whether and when men should have this test. Some experts suggest it if you are older than 39 and are -American or have a father or brother who got prostate cancer when he was younger than 72. When should you call for help? Watch closely for changes in your health, and be sure to contact your doctor if you have any problems or symptoms that concern you. Where can you learn more? Go to http://lavon-lonny.info/. Enter P072 in the search box to learn more about \"Well Visit, Ages 25 to 48: Care Instructions. \"  Current as of: March 29, 2018  Content Version: 11.8  © 8679-0984 [employer]. Care instructions adapted under license by [employer] (which disclaims liability or warranty for this information). If you have questions about a medical condition or this instruction, always ask your healthcare professional. [address] any warranty or liability for your use of this information. Well Visit, Ages 25 to 48: Care Instructions  Your Care Instructions    Physical exams can help you stay healthy. Your doctor has checked your overall health and may have suggested ways to take good care of yourself. He or she also may have recommended tests. At home, you can help prevent illness with healthy eating, regular exercise, and other steps. Follow-up care is a key part of your treatment and safety. Be sure to make and go to all appointments, and call your doctor if you are having problems. It's also a good idea to know your test results and keep a list of the medicines you take. How can you care for yourself at home? · Reach and stay at a healthy weight. This will lower your risk for many problems, such as obesity, diabetes, heart disease, and high blood pressure. · Get at least 30 minutes of physical activity on most days of the week. Walking is a good choice. You also may want to do other activities, such as running, swimming, cycling, or playing tennis or team sports. Discuss any changes in your exercise program with your doctor.   · Do not smoke or allow others to smoke around you. If you need help quitting, talk to your doctor about stop-smoking programs and medicines. These can increase your chances of quitting for good. · Talk to your doctor about whether you have any risk factors for sexually transmitted infections (STIs). Having one sex partner (who does not have STIs and does not have sex with anyone else) is a good way to avoid these infections. · Use birth control if you do not want to have children at this time. Talk with your doctor about the choices available and what might be best for you. · Protect your skin from too much sun. When you're outdoors from 10 a.m. to 4 p.m., stay in the shade or cover up with clothing and a hat with a wide brim. Wear sunglasses that block UV rays. Even when it's cloudy, put broad-spectrum sunscreen (SPF 30 or higher) on any exposed skin. · See a dentist one or two times a year for checkups and to have your teeth cleaned. · Wear a seat belt in the car. · Drink alcohol in moderation, if at all. That means no more than 2 drinks a day for men and 1 drink a day for women. Follow your doctor's advice about when to have certain tests. These tests can spot problems early. For everyone  · Cholesterol. Have the fat (cholesterol) in your blood tested after age 21. Your doctor will tell you how often to have this done based on your age, family history, or other things that can increase your risk for heart disease. · Blood pressure. Have your blood pressure checked during a routine doctor visit. Your doctor will tell you how often to check your blood pressure based on your age, your blood pressure results, and other factors. · Vision. Talk with your doctor about how often to have a glaucoma test.  · Diabetes. Ask your doctor whether you should have tests for diabetes. · Colon cancer. Have a test for colon cancer at age 48. You may have one of several tests.  If you are younger than 48, you may need a test earlier if you have any risk factors. Risk factors include whether you already had a precancerous polyp removed from your colon or whether your parent, brother, sister, or child has had colon cancer. For women  · Breast exam and mammogram. Talk to your doctor about when you should have a clinical breast exam and a mammogram. Medical experts differ on whether and how often women under 50 should have these tests. Your doctor can help you decide what is right for you. · Pap test and pelvic exam. Begin Pap tests at age 24. A Pap test is the best way to find cervical cancer. The test often is part of a pelvic exam. Ask how often to have this test.  · Tests for sexually transmitted infections (STIs). Ask whether you should have tests for STIs. You may be at risk if you have sex with more than one person, especially if your partners do not wear condoms. For men  · Tests for sexually transmitted infections (STIs). Ask whether you should have tests for STIs. You may be at risk if you have sex with more than one person, especially if you do not wear a condom. · Testicular cancer exam. Ask your doctor whether you should check your testicles regularly. · Prostate exam. Talk to your doctor about whether you should have a blood test (called a PSA test) for prostate cancer. Experts differ on whether and when men should have this test. Some experts suggest it if you are older than 39 and are -American or have a father or brother who got prostate cancer when he was younger than 72. When should you call for help? Watch closely for changes in your health, and be sure to contact your doctor if you have any problems or symptoms that concern you. Where can you learn more? Go to http://lavon-lonny.info/. Enter P072 in the search box to learn more about \"Well Visit, Ages 25 to 48: Care Instructions. \"  Current as of: March 29, 2018  Content Version: 11.8  © 9799-3899 [employer].  Care instructions adapted under license by [employer] (which disclaims liability or warranty for this information). If you have questions about a medical condition or this instruction, always ask your healthcare professional. [address] any warranty or liability for your use of this information.

## 2018-10-22 NOTE — PROGRESS NOTES
Chief Complaint   Patient presents with    Physical     1. Have you been to the ER, urgent care clinic since your last visit? Hospitalized since your last visit?no    2. Have you seen or consulted any other health care providers outside of the [address] [place] [address] since your last visit? Include any pap smears or colon screening.  no

## 2018-10-22 NOTE — LETTER
10/30/2018 1:42 PM 
 
Mr. Keke Long [address] [id number] Dear Keke Long: Please find your most recent results below. Resulted Orders CBC WITH AUTOMATED DIFF Result Value Ref Range WBC 5.1 3.4 - 10.8 x10E3/uL  
 RBC 5.15 4.14 - 5.80 x10E6/uL HGB 15.0 13.0 - 17.7 g/dL HCT 43.8 37.5 - 51.0 % MCV 85 79 - 97 fL  
 MCH 29.1 26.6 - 33.0 pg  
 MCHC 34.2 31.5 - 35.7 g/dL  
 RDW 14.1 12.3 - 15.4 % PLATELET 237 649 - 291 x10E3/uL NEUTROPHILS 48 Not Estab. % Lymphocytes 38 Not Estab. % MONOCYTES 11 Not Estab. % EOSINOPHILS 2 Not Estab. % BASOPHILS 1 Not Estab. %  
 ABS. NEUTROPHILS 2.4 1.4 - 7.0 x10E3/uL Abs Lymphocytes 1.9 0.7 - 3.1 x10E3/uL  
 ABS. MONOCYTES 0.6 0.1 - 0.9 x10E3/uL  
 ABS. EOSINOPHILS 0.1 0.0 - 0.4 x10E3/uL  
 ABS. BASOPHILS 0.0 0.0 - 0.2 x10E3/uL IMMATURE GRANULOCYTES 0 Not Estab. %  
 ABS. IMM. GRANS. 0.0 0.0 - 0.1 x10E3/uL Narrative Performed at:  [address]  [id number] : Olga Danielson MD, Phone:  [phone] METABOLIC PANEL, COMPREHENSIVE Result Value Ref Range Glucose 103 (H) 65 - 99 mg/dL BUN 17 6 - 24 mg/dL Creatinine 0.84 0.76 - 1.27 mg/dL GFR est non- >59 mL/min/1.73 GFR est  >59 mL/min/1.73  
 BUN/Creatinine ratio 20 9 - 20 Sodium 137 134 - 144 mmol/L Potassium 4.4 3.5 - 5.2 mmol/L Chloride 101 96 - 106 mmol/L  
 CO2 24 20 - 29 mmol/L Calcium 9.4 8.7 - 10.2 mg/dL Protein, total 6.7 6.0 - 8.5 g/dL Albumin 4.4 3.5 - 5.5 g/dL GLOBULIN, TOTAL 2.3 1.5 - 4.5 g/dL A-G Ratio 1.9 1.2 - 2.2 Bilirubin, total 0.5 0.0 - 1.2 mg/dL Alk. phosphatase 56 39 - 117 IU/L  
 AST (SGOT) 15 0 - 40 IU/L  
 ALT (SGPT) 14 0 - 44 IU/L Narrative Performed at:  [address]  [id number] : Olga Danielson MD, Phone:  [phone] LIPID PANEL  
 Result Value Ref Range Cholesterol, total 164 100 - 199 mg/dL Triglyceride 53 0 - 149 mg/dL HDL Cholesterol 65 >39 mg/dL VLDL, calculated 11 5 - 40 mg/dL LDL, calculated 88 0 - 99 mg/dL Narrative Performed at:  [address]  [id number] : Ryann Hanks MD, Phone:  [phone] PSA, DIAGNOSTIC (PROSTATE SPECIFIC AG) Result Value Ref Range Prostate Specific Ag 0.5 0.0 - 4.0 ng/mL RECOMMENDATIONS: 
Labs look good. Liver, kidneys, blood counts, electrolytes, cholesterol, thyroid, and blood sugar all look normal. Follow up as planned during your appointment. Please call me if you have any questions: [phone] Sincerely, Nirav James MD

## 2018-10-23 LAB
ALBUMIN SERPL-MCNC: 4.4 G/DL (ref 3.5–5.5)
ALBUMIN/GLOB SERPL: 1.9 {RATIO} (ref 1.2–2.2)
ALP SERPL-CCNC: 56 IU/L (ref 39–117)
ALT SERPL-CCNC: 14 IU/L (ref 0–44)
AST SERPL-CCNC: 15 IU/L (ref 0–40)
BASOPHILS # BLD AUTO: 0 X10E3/UL (ref 0–0.2)
BASOPHILS NFR BLD AUTO: 1 %
BILIRUB SERPL-MCNC: 0.5 MG/DL (ref 0–1.2)
BUN SERPL-MCNC: 17 MG/DL (ref 6–24)
BUN/CREAT SERPL: 20 (ref 9–20)
CALCIUM SERPL-MCNC: 9.4 MG/DL (ref 8.7–10.2)
CHLORIDE SERPL-SCNC: 101 MMOL/L (ref 96–106)
CHOLEST SERPL-MCNC: 164 MG/DL (ref 100–199)
CO2 SERPL-SCNC: 24 MMOL/L (ref 20–29)
CREAT SERPL-MCNC: 0.84 MG/DL (ref 0.76–1.27)
EOSINOPHIL # BLD AUTO: 0.1 X10E3/UL (ref 0–0.4)
EOSINOPHIL NFR BLD AUTO: 2 %
ERYTHROCYTE [DISTWIDTH] IN BLOOD BY AUTOMATED COUNT: 14.1 % (ref 12.3–15.4)
GLOBULIN SER CALC-MCNC: 2.3 G/DL (ref 1.5–4.5)
GLUCOSE SERPL-MCNC: 103 MG/DL (ref 65–99)
HCT VFR BLD AUTO: 43.8 % (ref 37.5–51)
HDLC SERPL-MCNC: 65 MG/DL
HGB BLD-MCNC: 15 G/DL (ref 13–17.7)
IMM GRANULOCYTES # BLD: 0 X10E3/UL (ref 0–0.1)
IMM GRANULOCYTES NFR BLD: 0 %
INTERPRETATION, 910389: NORMAL
LDLC SERPL CALC-MCNC: 88 MG/DL (ref 0–99)
LYMPHOCYTES # BLD AUTO: 1.9 X10E3/UL (ref 0.7–3.1)
LYMPHOCYTES NFR BLD AUTO: 38 %
MCH RBC QN AUTO: 29.1 PG (ref 26.6–33)
MCHC RBC AUTO-ENTMCNC: 34.2 G/DL (ref 31.5–35.7)
MCV RBC AUTO: 85 FL (ref 79–97)
MONOCYTES # BLD AUTO: 0.6 X10E3/UL (ref 0.1–0.9)
MONOCYTES NFR BLD AUTO: 11 %
NEUTROPHILS # BLD AUTO: 2.4 X10E3/UL (ref 1.4–7)
NEUTROPHILS NFR BLD AUTO: 48 %
PLATELET # BLD AUTO: 264 X10E3/UL (ref 150–379)
POTASSIUM SERPL-SCNC: 4.4 MMOL/L (ref 3.5–5.2)
PROT SERPL-MCNC: 6.7 G/DL (ref 6–8.5)
PSA SERPL-MCNC: 0.5 NG/ML (ref 0–4)
RBC # BLD AUTO: 5.15 X10E6/UL (ref 4.14–5.8)
SODIUM SERPL-SCNC: 137 MMOL/L (ref 134–144)
TRIGL SERPL-MCNC: 53 MG/DL (ref 0–149)
VLDLC SERPL CALC-MCNC: 11 MG/DL (ref 5–40)
WBC # BLD AUTO: 5.1 X10E3/UL (ref 3.4–10.8)

## 2018-10-31 ENCOUNTER — TELEPHONE (OUTPATIENT)
Dept: FAMILY MEDICINE CLINIC | Age: 43
End: 2018-10-31

## 2018-12-03 ENCOUNTER — TELEPHONE (OUTPATIENT)
Dept: NEUROLOGY | Age: 43
End: 2018-12-03

## 2018-12-03 NOTE — TELEPHONE ENCOUNTER
----- Message from Phu Oviedo sent at 12/3/2018  8:30 AM EST -----  Regarding: /Telephone  Pt called requesting a call back to cancel and reschedule Botox procedure. Pt best contact number is [phone] .

## 2019-02-18 ENCOUNTER — TELEPHONE (OUTPATIENT)
Dept: NEUROLOGY | Age: 44
End: 2019-02-18

## 2019-02-18 NOTE — TELEPHONE ENCOUNTER
Pt calling to schedule botox appt.  Please call pt's mobile, if he does not answer please call [phone] and ask for him

## 2019-02-20 DIAGNOSIS — G51.39 HEMIFACIAL SPASM: ICD-10-CM

## 2019-02-20 NOTE — TELEPHONE ENCOUNTER
Re: Botox    Before new PA can be processed, new Botox Rx needs to be placed in CC. Current Botox Rx is from 2017.

## 2019-02-20 NOTE — TELEPHONE ENCOUNTER
Re: Botox    Received message from Dr. Berna Michel:  Sue Reyes MD   to Me         10:41 AM   Rx printed\"    Tried to setup  PA via CMM and received the following message:  \" Outcome   Additional Information Required   Your PA has been resolved, no additional PA is required. For further inquiries please contact the number on the back of the member prescription card. \"    Called and s/w Juan De La Garza @ [facility] to get clarification. Per Juan De La Garza, there is still a valid auth on file for [id number].  approved. Auth # [id number]. Auth good until 11/1/19.    96738 no PA required per [facility]. Call reference # [id number]. SPP is [facility]. Phone # is [phone]. Forward to nurse.

## 2019-02-21 ENCOUNTER — TELEPHONE (OUTPATIENT)
Dept: NEUROLOGY | Age: 44
End: 2019-02-21

## 2019-02-21 NOTE — TELEPHONE ENCOUNTER
Patient calling for sooner Botox appointment. Please advise.     Best # [phone] or [phone]

## 2019-02-26 ENCOUNTER — TELEPHONE (OUTPATIENT)
Dept: NEUROLOGY | Age: 44
End: 2019-02-26

## 2019-03-06 ENCOUNTER — OFFICE VISIT (OUTPATIENT)
Dept: NEUROLOGY | Age: 44
End: 2019-03-06

## 2019-03-06 VITALS
SYSTOLIC BLOOD PRESSURE: 150 MMHG | HEART RATE: 78 BPM | HEIGHT: 72 IN | OXYGEN SATURATION: 98 % | RESPIRATION RATE: 14 BRPM | BODY MASS INDEX: 30.75 KG/M2 | WEIGHT: 227 LBS | DIASTOLIC BLOOD PRESSURE: 90 MMHG

## 2019-03-06 DIAGNOSIS — G51.39 HEMIFACIAL SPASM: Primary | ICD-10-CM

## 2019-03-06 NOTE — PROGRESS NOTES
Botox Injection Note           Indication: Hemifacial spasm and blepharospasm.       Procedure:   Botox concentration: 100 units in 2 ml of preservative-free normal saline. Following muscles were injected under EMG guidance:      1.  R Orbicularis oculi, palpebral part, 2.5 units each in 4 sites: Total 10 units  2.  R Levator labi superioris alaque nasi: Total 2.5 units  3.  R Zygomaticus:  Total 2.5 units  4. R orbicularis oris: Total 2.5 units   5. R mentalis: Total 2.5 units          100 units Botox were reconstituted, 20 units injected as above and the remainder was unavoidably wasted.       Patient tolerated procedure well.       _____________________________   Rashmi Vital M.D.

## 2019-03-06 NOTE — PATIENT INSTRUCTIONS
A Healthy Lifestyle: Care Instructions  Your Care Instructions    A healthy lifestyle can help you feel good, stay at a healthy weight, and have plenty of energy for both work and play. A healthy lifestyle is something you can share with your whole family. A healthy lifestyle also can lower your risk for serious health problems, such as high blood pressure, heart disease, and diabetes. You can follow a few steps listed below to improve your health and the health of your family. Follow-up care is a key part of your treatment and safety. Be sure to make and go to all appointments, and call your doctor if you are having problems. It's also a good idea to know your test results and keep a list of the medicines you take. How can you care for yourself at home? · Do not eat too much sugar, fat, or fast foods. You can still have dessert and treats now and then. The goal is moderation. · Start small to improve your eating habits. Pay attention to portion sizes, drink less juice and soda pop, and eat more fruits and vegetables. ? Eat a healthy amount of food. A 3-ounce serving of meat, for example, is about the size of a deck of cards. Fill the rest of your plate with vegetables and whole grains. ? Limit the amount of soda and sports drinks you have every day. Drink more water when you are thirsty. ? Eat at least 5 servings of fruits and vegetables every day. It may seem like a lot, but it is not hard to reach this goal. A serving or helping is 1 piece of fruit, 1 cup of vegetables, or 2 cups of leafy, raw vegetables. Have an apple or some carrot sticks as an afternoon snack instead of a candy bar. Try to have fruits and/or vegetables at every meal.  · Make exercise part of your daily routine. You may want to start with simple activities, such as walking, bicycling, or slow swimming. Try to be active 30 to 60 minutes every day. You do not need to do all 30 to 60 minutes all at once.  For example, you can exercise 3 times a day for 10 or 20 minutes. Moderate exercise is safe for most people, but it is always a good idea to talk to your doctor before starting an exercise program.  · Keep moving. Vladimir Buddle the lawn, work in the garden, or [place]. Take the stairs instead of the elevator at work. · If you smoke, quit. People who smoke have an increased risk for heart attack, stroke, cancer, and other lung illnesses. Quitting is hard, but there are ways to boost your chance of quitting tobacco for good. ? Use nicotine gum, patches, or lozenges. ? Ask your doctor about stop-smoking programs and medicines. ? Keep trying. In addition to reducing your risk of diseases in the future, you will notice some benefits soon after you stop using tobacco. If you have shortness of breath or asthma symptoms, they will likely get better within a few weeks after you quit. · Limit how much alcohol you drink. Moderate amounts of alcohol (up to 2 drinks a day for men, 1 drink a day for women) are okay. But drinking too much can lead to liver problems, high blood pressure, and other health problems. Family health  If you have a family, there are many things you can do together to improve your health. · Eat meals together as a family as often as possible. · Eat healthy foods. This includes fruits, vegetables, lean meats and dairy, and whole grains. · Include your family in your fitness plan. Most people think of activities such as jogging or tennis as the way to fitness, but there are many ways you and your family can be more active. Anything that makes you breathe hard and gets your heart pumping is exercise. Here are some tips:  ? Walk to do errands or to take your child to school or the bus.  ? Go for a family bike ride after dinner instead of watching TV. Where can you learn more? Go to http://lavon-lonny.info/. Enter X144 in the search box to learn more about \"A Healthy Lifestyle: Care Instructions. \"  Current as of: September 11, 2018  Content Version: 11.9  © 1150-1542 [employer]. Care instructions adapted under license by [employer] (which disclaims liability or warranty for this information). If you have questions about a medical condition or this instruction, always ask your healthcare professional. [address] any warranty or liability for your use of this information. 10 [facility]   Statement to Patients  April 1, 2014      In an effort to ensure the large volume of patient prescription refills is processed in the most efficient and expeditious manner, we are asking our patients to assist us by calling your Pharmacy for all prescription refills, this will include also your  Mail Order Pharmacy. The pharmacy will contact our office electronically to continue the refill process. Please do not wait until the last minute to call your pharmacy. We need at least 48 hours (2days) to fill prescriptions. We also encourage you to call your pharmacy before going to  your prescription to make sure it is ready. With regard to controlled substance prescription refill requests (narcotic refills) that need to be picked up at our office, we ask your cooperation by providing us with at least 72 hours (3days) notice that you will need a refill. We will not refill narcotic prescription refill requests after 4:00pm on any weekday, Monday through Thursday, or after 2:00pm on Fridays, or on the weekends. We encourage everyone to explore another way of getting your prescription refill request processed using [employer], our patient web portal through our electronic medical record system. [employer] is an efficient and effective way to communicate your medication request directly to the office and  downloadable as an pabol on your smart phone .  [employer] also features a review functionality that allows you to view your medication list as well as leave messages for your physician. Are you ready to get connected? If so please review the attatched instructions or speak to any of our staff to get you set up right away! Thank you so much for your cooperation. Should you have any questions please contact our Practice Administrator.     The Physicians and Staff,  [facility]

## 2019-05-16 ENCOUNTER — TELEPHONE (OUTPATIENT)
Dept: FAMILY MEDICINE CLINIC | Age: 44
End: 2019-05-16

## 2019-05-16 NOTE — TELEPHONE ENCOUNTER
Patient is requesting to speak with a nurse. He stated that he \"just isn't feeling right\" and his BP is elevated, dizziness, and congestion. He would like to be seen today.

## 2019-06-03 ENCOUNTER — TELEPHONE (OUTPATIENT)
Dept: NEUROLOGY | Age: 44
End: 2019-06-03

## 2019-06-03 NOTE — TELEPHONE ENCOUNTER
Called to set up delivery of Botox and was told by [facility] that the coverage had . Called Patient he stated he would fax over a copy of the new cards asap.  I verbalized understanding

## 2019-09-16 ENCOUNTER — OFFICE VISIT (OUTPATIENT)
Dept: NEUROLOGY | Age: 44
End: 2019-09-16

## 2019-09-16 ENCOUNTER — DOCUMENTATION ONLY (OUTPATIENT)
Dept: NEUROLOGY | Age: 44
End: 2019-09-16

## 2019-09-16 VITALS
HEART RATE: 82 BPM | DIASTOLIC BLOOD PRESSURE: 80 MMHG | RESPIRATION RATE: 20 BRPM | WEIGHT: 210 LBS | SYSTOLIC BLOOD PRESSURE: 130 MMHG | BODY MASS INDEX: 28.44 KG/M2 | HEIGHT: 72 IN | OXYGEN SATURATION: 96 %

## 2019-09-16 DIAGNOSIS — G51.39 HEMIFACIAL SPASM: Primary | ICD-10-CM

## 2019-09-16 DIAGNOSIS — G24.5 BLEPHAROSPASM: ICD-10-CM

## 2019-09-16 NOTE — PROGRESS NOTES
Botox Injection Note           Indication: Hemifacial spasm and blepharospasm.       Procedure:   Botox concentration: 100 units in 2 ml of preservative-free normal saline. Following muscles were injected under EMG guidance:      1.  R Orbicularis oculi, palpebral part, 2.5 units each in 4 sites: Total 10 units  2.  R Levator labi superioris alaque nasi: Total 2.5 units  3.  R Zygomaticus: Total 2.5 units  4. R orbicularis oris: Total 2.5 units   5. R mentalis: Total 2.5 units          100 units Botox were reconstituted, 20 units injected as above and the remainder was unavoidably wasted.       Patient tolerated procedure well.

## 2019-09-16 NOTE — PATIENT INSTRUCTIONS
10 [facility]   Statement to Patients  April 1, 2014      In an effort to ensure the large volume of patient prescription refills is processed in the most efficient and expeditious manner, we are asking our patients to assist us by calling your Pharmacy for all prescription refills, this will include also your  Mail Order Pharmacy. The pharmacy will contact our office electronically to continue the refill process. Please do not wait until the last minute to call your pharmacy. We need at least 48 hours (2days) to fill prescriptions. We also encourage you to call your pharmacy before going to  your prescription to make sure it is ready. With regard to controlled substance prescription refill requests (narcotic refills) that need to be picked up at our office, we ask your cooperation by providing us with at least 72 hours (3days) notice that you will need a refill. We will not refill narcotic prescription refill requests after 4:00pm on any weekday, Monday through Thursday, or after 2:00pm on Fridays, or on the weekends. We encourage everyone to explore another way of getting your prescription refill request processed using [employer], our patient web portal through our electronic medical record system. [employer] is an efficient and effective way to communicate your medication request directly to the office and  downloadable as an pablo on your smart phone . [employer] also features a review functionality that allows you to view your medication list as well as leave messages for your physician. Are you ready to get connected? If so please review the attatched instructions or speak to any of our staff to get you set up right away! Thank you so much for your cooperation. Should you have any questions please contact our Practice Administrator.     The Physicians and Staff,  [address] [place] [address] Neurology Clinic

## 2019-12-02 ENCOUNTER — TELEPHONE (OUTPATIENT)
Dept: NEUROLOGY | Age: 44
End: 2019-12-02

## 2019-12-11 NOTE — TELEPHONE ENCOUNTER
Re: Botox Approved    Approval rec'd from [address] [place] [address]     Ref: [id number]   approved 12/11/19-12/10/20  [id number] No PA required. SSP is [facility] [phone]    Forwarding to nurse.

## 2019-12-13 ENCOUNTER — DOCUMENTATION ONLY (OUTPATIENT)
Dept: NEUROLOGY | Age: 44
End: 2019-12-13

## 2020-01-03 ENCOUNTER — TELEPHONE (OUTPATIENT)
Dept: NEUROLOGY | Age: 45
End: 2020-01-03

## 2020-01-03 NOTE — TELEPHONE ENCOUNTER
Re: Botox     Ran eligibilty for patient today, plan is no longer active, terminated on 12/31/19. Called and left message with patient to have him call back or fax over new insurance information. Patient has an appt on 1/8/20.

## 2020-01-03 NOTE — TELEPHONE ENCOUNTER
Per Jess Dick, It looks like we have botox in the office already for Mr. Antoinette Patel just need to get his new insurance information to see if authorization for CPT will be needed on new plan.  Just as an FYI

## 2020-01-06 NOTE — TELEPHONE ENCOUNTER
Per Medical Sarah Ann, [id number] does not require a PA. I will hold off on obtaining a new PA for  until closer to patient's next procedure date.

## 2020-01-07 NOTE — TELEPHONE ENCOUNTER
Yes, procedure code does not need an authorization.  Per the notes it looks like you already have medication in the office from previous Domenico Meter. So we should be good to go.  I will get a new auth for the  (Botox medication) before patient's next injection date.

## 2020-01-08 ENCOUNTER — OFFICE VISIT (OUTPATIENT)
Dept: NEUROLOGY | Age: 45
End: 2020-01-08

## 2020-01-08 VITALS
HEIGHT: 72 IN | BODY MASS INDEX: 28.44 KG/M2 | RESPIRATION RATE: 16 BRPM | OXYGEN SATURATION: 98 % | WEIGHT: 210 LBS | SYSTOLIC BLOOD PRESSURE: 120 MMHG | DIASTOLIC BLOOD PRESSURE: 88 MMHG | HEART RATE: 64 BPM

## 2020-01-08 DIAGNOSIS — G51.39 HEMIFACIAL SPASM: Primary | ICD-10-CM

## 2020-01-08 DIAGNOSIS — G24.5 BLEPHAROSPASM: ICD-10-CM

## 2020-01-08 NOTE — PROGRESS NOTES
Botox Injection Note           Indication: Hemifacial spasm and blepharospasm.       Procedure:   Botox concentration: 100 units in 2 ml of preservative-free normal saline. Following muscles were injected under EMG guidance:      1.  R Orbicularis oculi, palpebral part, 2.5 units each in 4 sites: Total 10 units  2.  R Levator labi superioris alaque nasi: Total 2.5 units  3.  R Zygomaticus: Total 2.5 units  4. R mentalis: Total 2.5 units          100 units Botox were reconstituted, 17.5 units injected as above and the remainder was unavoidably wasted.       Patient tolerated procedure well.

## 2020-01-08 NOTE — PATIENT INSTRUCTIONS
10 [facility]   Statement to Patients  April 1, 2014      In an effort to ensure the large volume of patient prescription refills is processed in the most efficient and expeditious manner, we are asking our patients to assist us by calling your Pharmacy for all prescription refills, this will include also your  Mail Order Pharmacy. The pharmacy will contact our office electronically to continue the refill process. Please do not wait until the last minute to call your pharmacy. We need at least 48 hours (2days) to fill prescriptions. We also encourage you to call your pharmacy before going to  your prescription to make sure it is ready. With regard to controlled substance prescription refill requests (narcotic refills) that need to be picked up at our office, we ask your cooperation by providing us with at least 72 hours (3days) notice that you will need a refill. We will not refill narcotic prescription refill requests after 4:00pm on any weekday, Monday through Thursday, or after 2:00pm on Fridays, or on the weekends. We encourage everyone to explore another way of getting your prescription refill request processed using [employer], our patient web portal through our electronic medical record system. [employer] is an efficient and effective way to communicate your medication request directly to the office and  downloadable as an pablo on your smart phone . [employer] also features a review functionality that allows you to view your medication list as well as leave messages for your physician. Are you ready to get connected? If so please review the attatched instructions or speak to any of our staff to get you set up right away! Thank you so much for your cooperation. Should you have any questions please contact our Practice Administrator.     The Physicians and Staff,  [facility]

## 2020-04-21 ENCOUNTER — TELEPHONE (OUTPATIENT)
Dept: NEUROLOGY | Age: 45
End: 2020-04-21

## 2020-04-29 NOTE — TELEPHONE ENCOUNTER
Re: Botox    Determination rec'd from [address], patient currently has auth [id number] approved for up to four (100 unit) vials per 90 days from 12/19/19-12/20/20 with three fill remaining.        Botox  [place] [address]---34/11/19-12/10/20  Botox 63893 no PA required     [facility] is [facility] [phone]

## 2020-05-05 ENCOUNTER — DOCUMENTATION ONLY (OUTPATIENT)
Dept: NEUROLOGY | Age: 45
End: 2020-05-05

## 2020-05-28 ENCOUNTER — OFFICE VISIT (OUTPATIENT)
Dept: NEUROLOGY | Age: 45
End: 2020-05-28

## 2020-05-28 VITALS
HEART RATE: 80 BPM | RESPIRATION RATE: 16 BRPM | OXYGEN SATURATION: 98 % | WEIGHT: 210 LBS | BODY MASS INDEX: 28.44 KG/M2 | SYSTOLIC BLOOD PRESSURE: 122 MMHG | DIASTOLIC BLOOD PRESSURE: 90 MMHG | HEIGHT: 72 IN

## 2020-05-28 DIAGNOSIS — G24.5 BLEPHAROSPASM: ICD-10-CM

## 2020-05-28 DIAGNOSIS — G51.39 HEMIFACIAL SPASM: Primary | ICD-10-CM

## 2020-05-28 NOTE — PROGRESS NOTES
Botox Injection Note           Indication: Hemifacial spasm and blepharospasm.       Procedure:   Botox concentration: 100 units in 2 ml of preservative-free normal saline. Following muscles were injected under EMG guidance:      1.  R Orbicularis oculi, palpebral part, 2.5 units each in 4 sites: Total 10 units  2.  R Levator labi superioris alaque nasi: Total 2.5 units  3.  R Zygomaticus: Total 2.5 units  4. R mentalis: Total 5 units          100 units Botox were reconstituted, 20 units injected as above and the remainder was unavoidably wasted.       Patient tolerated procedure well.

## 2020-05-28 NOTE — PATIENT INSTRUCTIONS
PRESCRIPTION REFILL POLICY [facility] Statement to Patients April 1, 2014 In an effort to ensure the large volume of patient prescription refills is processed in the most efficient and expeditious manner, we are asking our patients to assist us by calling your Pharmacy for all prescription refills, this will include also your  Mail Order Pharmacy. The pharmacy will contact our office electronically to continue the refill process. Please do not wait until the last minute to call your pharmacy. We need at least 48 hours (2days) to fill prescriptions. We also encourage you to call your pharmacy before going to  your prescription to make sure it is ready. With regard to controlled substance prescription refill requests (narcotic refills) that need to be picked up at our office, we ask your cooperation by providing us with at least 72 hours (3days) notice that you will need a refill. We will not refill narcotic prescription refill requests after 4:00pm on any weekday, Monday through Thursday, or after 2:00pm on Fridays, or on the weekends. We encourage everyone to explore another way of getting your prescription refill request processed using [employer], our patient web portal through our electronic medical record system. [employer] is an efficient and effective way to communicate your medication request directly to the office and  downloadable as an pablo on your smart phone . [employer] also features a review functionality that allows you to view your medication list as well as leave messages for your physician. Are you ready to get connected? If so please review the attatched instructions or speak to any of our staff to get you set up right away! Thank you so much for your cooperation. Should you have any questions please contact our Practice Administrator. The Physicians and Staff,  [facility]

## 2020-07-13 DIAGNOSIS — G51.39 HEMIFACIAL SPASM: ICD-10-CM

## 2020-07-21 ENCOUNTER — TELEPHONE (OUTPATIENT)
Dept: NEUROLOGY | Age: 45
End: 2020-07-21

## 2020-07-24 ENCOUNTER — DOCUMENTATION ONLY (OUTPATIENT)
Dept: NEUROLOGY | Age: 45
End: 2020-07-24

## 2020-10-08 ENCOUNTER — TELEPHONE (OUTPATIENT)
Dept: NEUROLOGY | Facility: CLINIC | Age: 45
End: 2020-10-08

## 2020-10-13 ENCOUNTER — DOCUMENTATION ONLY (OUTPATIENT)
Dept: NEUROLOGY | Facility: CLINIC | Age: 45
End: 2020-10-13

## 2020-12-10 ENCOUNTER — TELEPHONE (OUTPATIENT)
Dept: NEUROLOGY | Age: 45
End: 2020-12-10

## 2020-12-10 NOTE — TELEPHONE ENCOUNTER
Appointment has been rescheduled. Nola, I have Botox in office. Will need new PA. Patient scheduled for 1/6/21. Thanks!

## 2020-12-10 NOTE — TELEPHONE ENCOUNTER
----- Message from Jordan Interiano sent at 12/10/2020  2:56 PM EST -----  Regarding: LUPIS/TELEPHONE  Contact: [phone]  General Message/Vendor Calls    Caller's first and last name:      Reason for call: Reschedule an appt for Botox injection      Callback required yes/no and why: Yes      Best contact number(s): [employer] - [phone] or Cell -[phone] (A detail message can be left)    Details to clarify the request: Patient requesting a callback to reschedule an appt for Botox injection. Per patient a detail message can be left on the cell phone.       Jordan Interiano

## 2020-12-11 NOTE — TELEPHONE ENCOUNTER
Re: Botox    Since medication is already in the office, we can just wait to do a new PA until closer to March when he has his next round of injections. The CPT code does not require a PA so he should be all set for January.

## 2020-12-30 DIAGNOSIS — G24.5 BLEPHAROSPASM: Primary | ICD-10-CM

## 2020-12-30 DIAGNOSIS — G51.39 HEMIFACIAL SPASM: ICD-10-CM

## 2021-01-06 ENCOUNTER — OFFICE VISIT (OUTPATIENT)
Dept: NEUROLOGY | Age: 46
End: 2021-01-06
Payer: COMMERCIAL

## 2021-01-06 VITALS
OXYGEN SATURATION: 98 % | HEART RATE: 80 BPM | SYSTOLIC BLOOD PRESSURE: 138 MMHG | RESPIRATION RATE: 16 BRPM | WEIGHT: 210 LBS | HEIGHT: 72 IN | BODY MASS INDEX: 28.44 KG/M2 | DIASTOLIC BLOOD PRESSURE: 80 MMHG

## 2021-01-06 DIAGNOSIS — G24.5 BLEPHAROSPASM: ICD-10-CM

## 2021-01-06 DIAGNOSIS — G51.31 HEMIFACIAL SPASM OF RIGHT SIDE OF FACE: Primary | ICD-10-CM

## 2021-01-06 PROCEDURE — 95874 GUIDE NERV DESTR NEEDLE EMG: CPT | Performed by: PSYCHIATRY & NEUROLOGY

## 2021-01-06 PROCEDURE — 64612 DESTROY NERVE FACE MUSCLE: CPT | Performed by: PSYCHIATRY & NEUROLOGY

## 2021-01-06 NOTE — PATIENT INSTRUCTIONS
PRESCRIPTION REFILL POLICY [facility] Statement to Patients April 1, 2014 In an effort to ensure the large volume of patient prescription refills is processed in the most efficient and expeditious manner, we are asking our patients to assist us by calling your Pharmacy for all prescription refills, this will include also your  Mail Order Pharmacy. The pharmacy will contact our office electronically to continue the refill process. Please do not wait until the last minute to call your pharmacy. We need at least 48 hours (2days) to fill prescriptions. We also encourage you to call your pharmacy before going to  your prescription to make sure it is ready. With regard to controlled substance prescription refill requests (narcotic refills) that need to be picked up at our office, we ask your cooperation by providing us with at least 72 hours (3days) notice that you will need a refill. We will not refill narcotic prescription refill requests after 4:00pm on any weekday, Monday through Thursday, or after 2:00pm on Fridays, or on the weekends. We encourage everyone to explore another way of getting your prescription refill request processed using [employer], our patient web portal through our electronic medical record system. [employer] is an efficient and effective way to communicate your medication request directly to the office and  downloadable as an pablo on your smart phone . [employer] also features a review functionality that allows you to view your medication list as well as leave messages for your physician. Are you ready to get connected? If so please review the attatched instructions or speak to any of our staff to get you set up right away! Thank you so much for your cooperation. Should you have any questions please contact our Practice Administrator. The Physicians and Staff,  [facility]

## 2021-02-22 ENCOUNTER — TELEPHONE (OUTPATIENT)
Dept: NEUROLOGY | Age: 46
End: 2021-02-22

## 2021-03-02 NOTE — TELEPHONE ENCOUNTER
Re: Botox approved    PA request for  sent to [address] [place] [address] Drive via [address] [facility] Effective 03/02/21-03/01/22  Auth # [id number]    [id number] does not require authorization.      [address] [facility] [phone]

## 2021-03-26 ENCOUNTER — TELEPHONE (OUTPATIENT)
Dept: NEUROLOGY | Age: 46
End: 2021-03-26

## 2021-03-26 DIAGNOSIS — G51.39 HEMIFACIAL SPASM: ICD-10-CM

## 2021-03-26 DIAGNOSIS — G51.8 FACIAL NERVE MOTOR DISORDER: Primary | ICD-10-CM

## 2021-03-26 NOTE — TELEPHONE ENCOUNTER
Calling to let our office know that botox will be here Tuesday for pt's Wednesday morning appt.      Just an [place]

## 2021-03-30 ENCOUNTER — DOCUMENTATION ONLY (OUTPATIENT)
Dept: NEUROLOGY | Age: 46
End: 2021-03-30

## 2021-03-31 ENCOUNTER — OFFICE VISIT (OUTPATIENT)
Dept: NEUROLOGY | Age: 46
End: 2021-03-31
Payer: COMMERCIAL

## 2021-03-31 VITALS
OXYGEN SATURATION: 98 % | HEIGHT: 72 IN | BODY MASS INDEX: 28.44 KG/M2 | RESPIRATION RATE: 16 BRPM | HEART RATE: 80 BPM | SYSTOLIC BLOOD PRESSURE: 122 MMHG | DIASTOLIC BLOOD PRESSURE: 80 MMHG | WEIGHT: 210 LBS

## 2021-03-31 DIAGNOSIS — G51.8 FACIAL NERVE MOTOR DISORDER: Primary | ICD-10-CM

## 2021-03-31 DIAGNOSIS — G51.31 HEMIFACIAL SPASM OF RIGHT SIDE OF FACE: ICD-10-CM

## 2021-03-31 DIAGNOSIS — G24.5 BLEPHAROSPASM: ICD-10-CM

## 2021-03-31 PROCEDURE — 64612 DESTROY NERVE FACE MUSCLE: CPT | Performed by: PSYCHIATRY & NEUROLOGY

## 2021-03-31 PROCEDURE — 95874 GUIDE NERV DESTR NEEDLE EMG: CPT | Performed by: PSYCHIATRY & NEUROLOGY

## 2021-03-31 NOTE — PATIENT INSTRUCTIONS
PRESCRIPTION REFILL POLICY [facility] Statement to Patients April 1, 2014 In an effort to ensure the large volume of patient prescription refills is processed in the most efficient and expeditious manner, we are asking our patients to assist us by calling your Pharmacy for all prescription refills, this will include also your  Mail Order Pharmacy. The pharmacy will contact our office electronically to continue the refill process. Please do not wait until the last minute to call your pharmacy. We need at least 48 hours (2days) to fill prescriptions. We also encourage you to call your pharmacy before going to  your prescription to make sure it is ready. With regard to controlled substance prescription refill requests (narcotic refills) that need to be picked up at our office, we ask your cooperation by providing us with at least 72 hours (3days) notice that you will need a refill. We will not refill narcotic prescription refill requests after 4:00pm on any weekday, Monday through Thursday, or after 2:00pm on Fridays, or on the weekends. We encourage everyone to explore another way of getting your prescription refill request processed using [employer], our patient web portal through our electronic medical record system. [employer] is an efficient and effective way to communicate your medication request directly to the office and  downloadable as an pablo on your smart phone . [employer] also features a review functionality that allows you to view your medication list as well as leave messages for your physician. Are you ready to get connected? If so please review the attatched instructions or speak to any of our staff to get you set up right away! Thank you so much for your cooperation. Should you have any questions please contact our Practice Administrator. The Physicians and Staff,  [facility]

## 2021-03-31 NOTE — PROGRESS NOTES
Botox Injection Note           Indication: Hemifacial spasm and blepharospasm.       Procedure:   Botox concentration: 100 units in 2 ml of preservative-free normal saline. Following muscles were injected under EMG guidance:      1.  R Orbicularis oculi, palpebral part, 2.5 units each in 5 sites (including outer lateral and above brow medially): Total 12.5 units  2.  R Levator labi superioris alaque nasi: Total 2.5 units  3.  R Zygomaticus: Total 2.5 units  4. R mentalis: Total 5 units          100 units Botox were reconstituted, 22.5 units injected as above and the remainder was unavoidably wasted.       Patient tolerated procedure well.

## 2021-05-18 ENCOUNTER — OFFICE VISIT (OUTPATIENT)
Dept: FAMILY MEDICINE CLINIC | Age: 46
End: 2021-05-18
Payer: COMMERCIAL

## 2021-05-18 VITALS
OXYGEN SATURATION: 97 % | HEIGHT: 72 IN | WEIGHT: 216.6 LBS | RESPIRATION RATE: 17 BRPM | SYSTOLIC BLOOD PRESSURE: 134 MMHG | BODY MASS INDEX: 29.34 KG/M2 | TEMPERATURE: 97.6 F | HEART RATE: 61 BPM | DIASTOLIC BLOOD PRESSURE: 93 MMHG

## 2021-05-18 DIAGNOSIS — R03.0 ELEVATED BP WITHOUT DIAGNOSIS OF HYPERTENSION: Primary | ICD-10-CM

## 2021-05-18 DIAGNOSIS — Z00.00 ROUTINE GENERAL MEDICAL EXAMINATION AT A HEALTH CARE FACILITY: ICD-10-CM

## 2021-05-18 DIAGNOSIS — Z23 ENCOUNTER FOR IMMUNIZATION: ICD-10-CM

## 2021-05-18 DIAGNOSIS — G51.31 HEMIFACIAL SPASM OF RIGHT SIDE OF FACE: ICD-10-CM

## 2021-05-18 PROCEDURE — 90471 IMMUNIZATION ADMIN: CPT | Performed by: FAMILY MEDICINE

## 2021-05-18 PROCEDURE — 99203 OFFICE O/P NEW LOW 30 MIN: CPT | Performed by: FAMILY MEDICINE

## 2021-05-18 PROCEDURE — 90715 TDAP VACCINE 7 YRS/> IM: CPT | Performed by: FAMILY MEDICINE

## 2021-05-18 RX ORDER — DEXAMETHASONE SODIUM PHOSPHATE 100 MG/10ML
10 INJECTION INTRAMUSCULAR; INTRAVENOUS
COMMUNITY
End: 2021-05-18

## 2021-05-18 NOTE — PROGRESS NOTES
Chief Complaint   Patient presents with   [address]     Pt is seen today to establish care. Pt reports that his wife checks his Bp at home, is normally better controlled. Pt had coffee only this am.     Pt gets Botox for hemifacial spasms. Subjective: (As above and below)     Chief Complaint   Patient presents with   [address]     he is a 54y.o. year old male who presents for evaluation. Reviewed PmHx, RxHx, FmHx, SocHx, AllgHx and updated in chart. Review of Systems - negative except as listed above    Objective:     Vitals:    05/18/21 0836 05/18/21 0848   BP: (!) 132/91 (!) 134/93   Pulse: 61    Resp: 17    Temp: 97.6 °F (36.4 °C)    TempSrc: Temporal    SpO2: 97%    Weight: 216 lb 9.6 oz (98.2 kg)    Height: 6' (1.829 m)      Physical Examination: General appearance - alert, well appearing, and in no distress  Mental status - normal mood, behavior, speech, dress, motor activity, and thought processes  Ears - bilateral TM's and external ear canals normal  Chest - clear to auscultation, no wheezes, rales or rhonchi, symmetric air entry  Heart - normal rate, regular rhythm, normal S1, S2, no murmurs, rubs, clicks or gallops  Musculoskeletal - no joint tenderness, deformity or swelling  Extremities - peripheral pulses normal, no pedal edema, no clubbing or cyanosis    Assessment/ Plan:   1. Elevated BP without diagnosis of hypertension  Check labs, pt will monitor, discussed considering medication is levels are averaging above normal   - METABOLIC PANEL, COMPREHENSIVE; Future  - CBC W/O DIFF; Future  - LIPID PANEL; Future  - HEMOGLOBIN A1C WITH EAG; Future  - TSH 3RD GENERATION; Future    2. Hemifacial spasm of right side of face  -followed by neurology     3. Routine general medical examination at a health care facility  - HEPATITIS C AB; Future    4.  Encounter for immunization  - TETANUS, DIPHTHERIA TOXOIDS AND ACELLULAR PERTUSSIS VACCINE (TDAP), IN INDIVIDS. >=7, IM  - CA IMMUNIZ ADMIN,1 SINGLE/COMB VAC/TOXOID       I have discussed the diagnosis with the patient and the intended plan as seen in the above orders. The patient has received an after-visit summary and questions were answered concerning future plans.      Medication Side Effects and Warnings were discussed with patient: yes  Patient Labs were reviewed: yes  Patient Past Records were reviewed:  yes    Pat Greene M.D.

## 2021-05-18 NOTE — PROGRESS NOTES
Health Maintenance Due   Topic Date Due    Hepatitis C Screening  Never done    COVID-19 Vaccine (1) Never done    DTaP/Tdap/Td series (2 - Td) 09/27/2020

## 2021-05-19 LAB
ALBUMIN SERPL-MCNC: 4.2 G/DL (ref 3.5–5)
ALBUMIN/GLOB SERPL: 1.4 {RATIO} (ref 1.1–2.2)
ALP SERPL-CCNC: 57 U/L (ref 45–117)
ALT SERPL-CCNC: 27 U/L (ref 12–78)
ANION GAP SERPL CALC-SCNC: 3 MMOL/L (ref 5–15)
AST SERPL-CCNC: 15 U/L (ref 15–37)
BILIRUB SERPL-MCNC: 0.5 MG/DL (ref 0.2–1)
BUN SERPL-MCNC: 19 MG/DL (ref 6–20)
BUN/CREAT SERPL: 23 (ref 12–20)
CALCIUM SERPL-MCNC: 9.3 MG/DL (ref 8.5–10.1)
CHLORIDE SERPL-SCNC: 104 MMOL/L (ref 97–108)
CHOLEST SERPL-MCNC: 202 MG/DL
CO2 SERPL-SCNC: 29 MMOL/L (ref 21–32)
CREAT SERPL-MCNC: 0.81 MG/DL (ref 0.7–1.3)
ERYTHROCYTE [DISTWIDTH] IN BLOOD BY AUTOMATED COUNT: 13.5 % (ref 11.5–14.5)
EST. AVERAGE GLUCOSE BLD GHB EST-MCNC: 117 MG/DL
GLOBULIN SER CALC-MCNC: 2.9 G/DL (ref 2–4)
GLUCOSE SERPL-MCNC: 106 MG/DL (ref 65–100)
HBA1C MFR BLD: 5.7 % (ref 4–5.6)
HCT VFR BLD AUTO: 46.9 % (ref 36.6–50.3)
HCV AB SERPL QL IA: NONREACTIVE
HCV COMMENT,HCGAC: NORMAL
HDLC SERPL-MCNC: 80 MG/DL
HDLC SERPL: 2.5 {RATIO} (ref 0–5)
HGB BLD-MCNC: 15.1 G/DL (ref 12.1–17)
LDLC SERPL CALC-MCNC: 111.6 MG/DL (ref 0–100)
MCH RBC QN AUTO: 29.4 PG (ref 26–34)
MCHC RBC AUTO-ENTMCNC: 32.2 G/DL (ref 30–36.5)
MCV RBC AUTO: 91.4 FL (ref 80–99)
NRBC # BLD: 0 K/UL (ref 0–0.01)
NRBC BLD-RTO: 0 PER 100 WBC
PLATELET # BLD AUTO: 266 K/UL (ref 150–400)
PMV BLD AUTO: 12.2 FL (ref 8.9–12.9)
POTASSIUM SERPL-SCNC: 5.2 MMOL/L (ref 3.5–5.1)
PROT SERPL-MCNC: 7.1 G/DL (ref 6.4–8.2)
RBC # BLD AUTO: 5.13 M/UL (ref 4.1–5.7)
SODIUM SERPL-SCNC: 136 MMOL/L (ref 136–145)
TRIGL SERPL-MCNC: 52 MG/DL (ref ?–150)
TSH SERPL DL<=0.05 MIU/L-ACNC: 2.82 UIU/ML (ref 0.36–3.74)
VLDLC SERPL CALC-MCNC: 10.4 MG/DL
WBC # BLD AUTO: 4.9 K/UL (ref 4.1–11.1)

## 2021-05-20 LAB
PSA SERPL-MCNC: 0.2 NG/ML (ref 0–4)
REFLEX CRITERIA: NORMAL

## 2021-06-14 ENCOUNTER — TELEPHONE (OUTPATIENT)
Dept: NEUROLOGY | Age: 46
End: 2021-06-14

## 2021-06-17 ENCOUNTER — DOCUMENTATION ONLY (OUTPATIENT)
Dept: NEUROLOGY | Age: 46
End: 2021-06-17

## 2021-06-23 ENCOUNTER — OFFICE VISIT (OUTPATIENT)
Dept: NEUROLOGY | Age: 46
End: 2021-06-23
Payer: COMMERCIAL

## 2021-06-23 VITALS
WEIGHT: 216 LBS | DIASTOLIC BLOOD PRESSURE: 80 MMHG | HEART RATE: 74 BPM | SYSTOLIC BLOOD PRESSURE: 128 MMHG | HEIGHT: 72 IN | RESPIRATION RATE: 16 BRPM | OXYGEN SATURATION: 98 % | BODY MASS INDEX: 29.26 KG/M2

## 2021-06-23 DIAGNOSIS — G51.8 FACIAL NERVE MOTOR DISORDER: ICD-10-CM

## 2021-06-23 DIAGNOSIS — G24.5 BLEPHAROSPASM: Primary | ICD-10-CM

## 2021-06-23 PROCEDURE — 95874 GUIDE NERV DESTR NEEDLE EMG: CPT | Performed by: PSYCHIATRY & NEUROLOGY

## 2021-06-23 PROCEDURE — 64612 DESTROY NERVE FACE MUSCLE: CPT | Performed by: PSYCHIATRY & NEUROLOGY

## 2021-06-23 NOTE — PATIENT INSTRUCTIONS
[facility]   Statement to Patients  April 1, 2014      In an effort to ensure the large volume of patient prescription refills is processed in the most efficient and expeditious manner, we are asking our patients to assist us by calling your Pharmacy for all prescription refills, this will include also your  Mail Order Pharmacy. The pharmacy will contact our office electronically to continue the refill process. Please do not wait until the last minute to call your pharmacy. We need at least 48 hours (2days) to fill prescriptions. We also encourage you to call your pharmacy before going to  your prescription to make sure it is ready. With regard to controlled substance prescription refill requests (narcotic refills) that need to be picked up at our office, we ask your cooperation by providing us with at least 72 hours (3days) notice that you will need a refill. We will not refill narcotic prescription refill requests after 4:00pm on any weekday, Monday through Thursday, or after 2:00pm on Fridays, or on the weekends. We encourage everyone to explore another way of getting your prescription refill request processed using [employer], our patient web portal through our electronic medical record system. [employer] is an efficient and effective way to communicate your medication request directly to the office and  downloadable as an pablo on your smart phone . [employer] also features a review functionality that allows you to view your medication list as well as leave messages for your physician. Are you ready to get connected? If so please review the attatched instructions or speak to any of our staff to get you set up right away! Thank you so much for your cooperation. Should you have any questions please contact our Practice Administrator.     The Physicians and Staff,  [facility]

## 2021-08-05 RX ORDER — LISINOPRIL 10 MG/1
10 TABLET ORAL DAILY
Qty: 30 TABLET | Refills: 2 | Status: SHIPPED | OUTPATIENT
Start: 2021-08-05 | End: 2021-11-23 | Stop reason: SDUPTHER

## 2021-09-15 ENCOUNTER — OFFICE VISIT (OUTPATIENT)
Dept: NEUROLOGY | Age: 46
End: 2021-09-15
Payer: COMMERCIAL

## 2021-09-15 DIAGNOSIS — G51.8 FACIAL NERVE MOTOR DISORDER: Primary | ICD-10-CM

## 2021-09-15 DIAGNOSIS — G51.31 HEMIFACIAL SPASM OF RIGHT SIDE OF FACE: ICD-10-CM

## 2021-09-15 PROCEDURE — 64612 DESTROY NERVE FACE MUSCLE: CPT | Performed by: PSYCHIATRY & NEUROLOGY

## 2021-09-15 PROCEDURE — 95874 GUIDE NERV DESTR NEEDLE EMG: CPT | Performed by: PSYCHIATRY & NEUROLOGY

## 2021-09-15 NOTE — PROGRESS NOTES
Botox Injection Note           Indication: Hemifacial spasm and blepharospasm.       Procedure:   Botox concentration: 100 units in 2 ml of preservative-free normal saline. Following muscles were injected under EMG guidance:      1. R Orbicularis oculi, palpebral part, 2.5 units each in 5 sites (including outer lateral and above brow medially): Total 12.5 units  2. R Levator labi superioris alaque nasi: NONE today  3. R Zygomaticus: Total 2.5 units  4. R mentalis: Total 5 units          100 units Botox were reconstituted, 20 units injected as above and the remainder was unavoidably wasted.       Patient tolerated procedure well.

## 2021-10-27 ENCOUNTER — TELEPHONE (OUTPATIENT)
Dept: NEUROLOGY | Age: 46
End: 2021-10-27

## 2021-10-27 NOTE — TELEPHONE ENCOUNTER
----- Message from Genny French sent at 10/27/2021 11:12 AM EDT -----  Regarding: Md Alanis/Telephone  General Message/Vendor Calls    Caller's first and last name: Flaquito Rangel       Reason for call: Needs to schedule deliver for botox       Callback required yes/no and why: yes, stated above.        Best contact number(s): option 6 - [phone]      Details to clarify the request: n/a       Genny French

## 2021-10-28 ENCOUNTER — TELEPHONE (OUTPATIENT)
Dept: NEUROLOGY | Age: 46
End: 2021-10-28

## 2021-11-02 ENCOUNTER — DOCUMENTATION ONLY (OUTPATIENT)
Dept: NEUROLOGY | Age: 46
End: 2021-11-02

## 2021-11-22 ENCOUNTER — TELEPHONE (OUTPATIENT)
Dept: FAMILY MEDICINE CLINIC | Age: 46
End: 2021-11-22

## 2021-11-22 DIAGNOSIS — E78.00 ELEVATED CHOLESTEROL: ICD-10-CM

## 2021-11-22 DIAGNOSIS — R73.9 ELEVATED BLOOD SUGAR: Primary | ICD-10-CM

## 2021-11-22 NOTE — TELEPHONE ENCOUNTER
----- Message from Radha Adamsopal sent at 11/22/2021 12:04 PM EST -----  Subject: Message to Provider    QUESTIONS  Information for Provider? Patient called in and asked about blood work   orders and there isn't any in   ---------------------------------------------------------------------------  --------------  [address]  What is the best way for the office to contact you? OK to leave message on   voicemail  Preferred Call Back Phone Number? [phone]  ---------------------------------------------------------------------------  --------------  SCRIPT ANSWERS  Relationship to Patient?  Self

## 2021-11-23 RX ORDER — LISINOPRIL 10 MG/1
10 TABLET ORAL DAILY
Qty: 30 TABLET | Refills: 2 | Status: SHIPPED | OUTPATIENT
Start: 2021-11-23 | End: 2022-06-09

## 2021-11-23 NOTE — TELEPHONE ENCOUNTER
Requested Prescriptions     Pending Prescriptions Disp Refills    lisinopriL (PRINIVIL, ZESTRIL) 10 mg tablet 30 Tablet 2     Sig: Take 1 Tablet by mouth daily.

## 2021-12-15 ENCOUNTER — OFFICE VISIT (OUTPATIENT)
Dept: NEUROLOGY | Age: 46
End: 2021-12-15
Payer: COMMERCIAL

## 2021-12-15 VITALS
SYSTOLIC BLOOD PRESSURE: 110 MMHG | DIASTOLIC BLOOD PRESSURE: 80 MMHG | RESPIRATION RATE: 18 BRPM | OXYGEN SATURATION: 98 %

## 2021-12-15 DIAGNOSIS — G51.8 FACIAL NERVE MOTOR DISORDER: Primary | ICD-10-CM

## 2021-12-15 PROCEDURE — 64612 DESTROY NERVE FACE MUSCLE: CPT | Performed by: PSYCHIATRY & NEUROLOGY

## 2021-12-15 PROCEDURE — 95874 GUIDE NERV DESTR NEEDLE EMG: CPT | Performed by: PSYCHIATRY & NEUROLOGY

## 2022-01-20 DIAGNOSIS — G51.8 FACIAL NERVE MOTOR DISORDER: ICD-10-CM

## 2022-01-21 ENCOUNTER — TELEPHONE (OUTPATIENT)
Dept: NEUROLOGY | Age: 47
End: 2022-01-21

## 2022-02-02 ENCOUNTER — DOCUMENTATION ONLY (OUTPATIENT)
Dept: NEUROLOGY | Age: 47
End: 2022-02-02

## 2022-02-02 NOTE — PROGRESS NOTES
botox came in the office: 2/2/22  MFR: allergjagdish  LOT: [id number]  Exp: 2/2024  Appointment: 3/23/22  Provider: [address]: [address] Phone Number: [phone]

## 2022-02-11 ENCOUNTER — TELEPHONE (OUTPATIENT)
Dept: NEUROLOGY | Age: 47
End: 2022-02-11

## 2022-03-10 NOTE — TELEPHONE ENCOUNTER
Re: botox    Created case in [facility] key# [id number]    Submitted and awaiting update.

## 2022-03-18 PROBLEM — G51.39 HEMIFACIAL SPASM: Status: ACTIVE | Noted: 2017-10-09

## 2022-03-18 NOTE — TELEPHONE ENCOUNTER
Re: Botox 100mg vial    rcvd PA approval for , Auth# [id number]    Effective 03/10/22-03/09/23    Called [facility] and confirmed cpt 14384 does not need PA but Pre-determination would be needed.     Transferred to [phone] and spoke with kimberly rome who confirmed cpt does not require PA or PD, call ref# [id number]

## 2022-03-23 ENCOUNTER — OFFICE VISIT (OUTPATIENT)
Dept: NEUROLOGY | Age: 47
End: 2022-03-23
Payer: COMMERCIAL

## 2022-03-23 VITALS
BODY MASS INDEX: 29.26 KG/M2 | WEIGHT: 216 LBS | SYSTOLIC BLOOD PRESSURE: 132 MMHG | RESPIRATION RATE: 18 BRPM | OXYGEN SATURATION: 99 % | HEART RATE: 76 BPM | DIASTOLIC BLOOD PRESSURE: 80 MMHG | HEIGHT: 72 IN

## 2022-03-23 DIAGNOSIS — G51.31 HEMIFACIAL SPASM OF RIGHT SIDE OF FACE: ICD-10-CM

## 2022-03-23 DIAGNOSIS — G51.8 FACIAL NERVE MOTOR DISORDER: Primary | ICD-10-CM

## 2022-03-23 PROCEDURE — 95874 GUIDE NERV DESTR NEEDLE EMG: CPT | Performed by: PSYCHIATRY & NEUROLOGY

## 2022-03-23 PROCEDURE — 64612 DESTROY NERVE FACE MUSCLE: CPT | Performed by: PSYCHIATRY & NEUROLOGY

## 2022-03-23 NOTE — PROGRESS NOTES
Botox Injection Note           Indication: Hemifacial spasm and blepharospasm.       Procedure:   Botox concentration: 100 units in 2 ml of preservative-free normal saline. Following muscles were injected under EMG guidance:      1. R Orbicularis oculi, palpebral part, 2.5 units each in 5 sites (including outer lateral and above brow medially): Total 12.5 units  2. R Levator labi superioris alaque nasi: 2.5 units  3. R Zygomaticus: Total 2.5 units  4. R mentalis: Total 5 units          100 units Botox were reconstituted, 22.5 units injected as above and the remainder was unavoidably wasted.       Patient tolerated procedure well.

## 2022-04-26 ENCOUNTER — DOCUMENTATION ONLY (OUTPATIENT)
Dept: NEUROLOGY | Age: 47
End: 2022-04-26

## 2022-06-22 ENCOUNTER — OFFICE VISIT (OUTPATIENT)
Dept: NEUROLOGY | Age: 47
End: 2022-06-22
Payer: COMMERCIAL

## 2022-06-22 VITALS
HEART RATE: 69 BPM | DIASTOLIC BLOOD PRESSURE: 86 MMHG | SYSTOLIC BLOOD PRESSURE: 138 MMHG | OXYGEN SATURATION: 99 % | RESPIRATION RATE: 16 BRPM

## 2022-06-22 DIAGNOSIS — G51.8 FACIAL NERVE MOTOR DISORDER: Primary | ICD-10-CM

## 2022-06-22 PROCEDURE — 64612 DESTROY NERVE FACE MUSCLE: CPT | Performed by: PSYCHIATRY & NEUROLOGY

## 2022-06-22 PROCEDURE — 95874 GUIDE NERV DESTR NEEDLE EMG: CPT | Performed by: PSYCHIATRY & NEUROLOGY

## 2022-06-22 NOTE — PROGRESS NOTES
Chief Complaint   Patient presents with    Procedure     Botox for Shahriar facial spasm      Visit Vitals  /86   Pulse 69   Resp 16   SpO2 99%

## 2022-07-22 ENCOUNTER — TELEPHONE (OUTPATIENT)
Dept: NEUROLOGY | Age: 47
End: 2022-07-22

## 2022-07-27 ENCOUNTER — DOCUMENTATION ONLY (OUTPATIENT)
Dept: NEUROLOGY | Age: 47
End: 2022-07-27

## 2022-07-27 NOTE — PROGRESS NOTES
1 box of OnabotulinumtoxinA Injection received on 07/27/2022 from [facility].      LOT: [id number]  EXP: 06/30/2024  MANU: Silvana Pabon

## 2022-09-21 ENCOUNTER — OFFICE VISIT (OUTPATIENT)
Dept: NEUROLOGY | Age: 47
End: 2022-09-21
Payer: COMMERCIAL

## 2022-09-21 VITALS — DIASTOLIC BLOOD PRESSURE: 88 MMHG | SYSTOLIC BLOOD PRESSURE: 122 MMHG

## 2022-09-21 DIAGNOSIS — G51.8 FACIAL NERVE MOTOR DISORDER: Primary | ICD-10-CM

## 2022-09-21 PROCEDURE — 64612 DESTROY NERVE FACE MUSCLE: CPT | Performed by: PSYCHIATRY & NEUROLOGY

## 2022-09-21 PROCEDURE — 95874 GUIDE NERV DESTR NEEDLE EMG: CPT | Performed by: PSYCHIATRY & NEUROLOGY

## 2022-09-21 NOTE — PROGRESS NOTES
Botox Injection Note           Indication: Hemifacial spasm and blepharospasm. Botox injections continue to provide significant relief from muscle twitching in his face. Procedure:   Botox concentration: 100 units in 2 ml of preservative-free normal saline. Following muscles were injected under EMG guidance:      1. R Orbicularis oculi, palpebral part, 2.5 units each in 5 sites (including outer lateral and above brow medially): Total 12.5 units  2. R Levator labi superioris alaque nasi: 2.5 units  3. R Zygomaticus: Total 2.5 units  4. R mentalis: Total 5 units          100 units Botox were reconstituted, 22.5 units injected as above and the remainder was unavoidably wasted. Patient tolerated procedure well.

## 2022-10-14 ENCOUNTER — TELEPHONE (OUTPATIENT)
Dept: NEUROLOGY | Age: 47
End: 2022-10-14

## 2022-10-19 ENCOUNTER — DOCUMENTATION ONLY (OUTPATIENT)
Dept: NEUROLOGY | Age: 47
End: 2022-10-19

## 2022-12-07 DIAGNOSIS — G51.8 FACIAL NERVE MOTOR DISORDER: ICD-10-CM

## 2022-12-07 RX ORDER — ONABOTULINUMTOXINA 100 [USP'U]/1
INJECTION, POWDER, LYOPHILIZED, FOR SOLUTION INTRADERMAL; INTRAMUSCULAR
Qty: 1 EACH | Refills: 3 | Status: SHIPPED | OUTPATIENT
Start: 2022-12-07

## 2022-12-28 ENCOUNTER — OFFICE VISIT (OUTPATIENT)
Dept: NEUROLOGY | Age: 47
End: 2022-12-28
Payer: COMMERCIAL

## 2022-12-28 VITALS
SYSTOLIC BLOOD PRESSURE: 136 MMHG | RESPIRATION RATE: 16 BRPM | HEART RATE: 71 BPM | DIASTOLIC BLOOD PRESSURE: 72 MMHG | HEIGHT: 72 IN | BODY MASS INDEX: 28.44 KG/M2 | WEIGHT: 210 LBS | OXYGEN SATURATION: 99 %

## 2022-12-28 DIAGNOSIS — G51.8 FACIAL NERVE MOTOR DISORDER: Primary | ICD-10-CM

## 2022-12-28 DIAGNOSIS — G51.31 HEMIFACIAL SPASM OF RIGHT SIDE OF FACE: ICD-10-CM

## 2022-12-28 PROCEDURE — 95874 GUIDE NERV DESTR NEEDLE EMG: CPT | Performed by: PSYCHIATRY & NEUROLOGY

## 2022-12-28 PROCEDURE — 64612 DESTROY NERVE FACE MUSCLE: CPT | Performed by: PSYCHIATRY & NEUROLOGY

## 2022-12-28 NOTE — PROGRESS NOTES
Botox Injection Note           Indication: Hemifacial spasm and blepharospasm. Botox injections continue to provide significant relief from muscle twitching in his face. Procedure:   Botox concentration: 100 units in 2 ml of preservative-free normal saline. Following muscles were injected under EMG guidance:      1. R Orbicularis oculi, palpebral part, 2.5 units each in 5 sites (including outer lateral and above brow medially): Total 12.5 units  2. R Levator labi superioris alaque nasi: 2.5 units  3. R Zygomaticus: Total 2.5 units  4. R mentalis: Total 5 units  5. Right risorius, total 2.5 units  6. Right inferior orbicularis oris: Total 2.5 units          100 units Botox were reconstituted, 27.5 units injected as above and the remainder was unavoidably wasted. Patient tolerated procedure well.

## 2022-12-28 NOTE — PROGRESS NOTES
Chief Complaint   Patient presents with    Procedure     botox     Visit Vitals  /72 (BP 1 Location: Left upper arm, BP Patient Position: Sitting)   Pulse 71   Resp 16   Ht 6' (1.829 m)   Wt 210 lb (95.3 kg)   SpO2 99%   BMI 28.48 kg/m²

## 2023-03-28 ENCOUNTER — TELEPHONE (OUTPATIENT)
Dept: NEUROLOGY | Age: 48
End: 2023-03-28

## 2023-03-28 NOTE — TELEPHONE ENCOUNTER
Re: Botox    Reviewing April schedule and see PA has termed, pt has bsi plan, [facility] is still [facility]    Created case in [facility] for [address], submitted and awaiting update. Will review [id number] once complete.

## 2023-03-29 NOTE — TELEPHONE ENCOUNTER
Re: Botox     is approved, effective 03/28/23-03/27/24, auth# [id number]    [id number]- No PA is required    [facility] is still [employer]  Faxed update to them about autha dn sent FYI to nurse.

## 2023-04-19 ENCOUNTER — OFFICE VISIT (OUTPATIENT)
Dept: NEUROLOGY | Age: 48
End: 2023-04-19
Payer: COMMERCIAL

## 2023-04-19 VITALS
OXYGEN SATURATION: 99 % | BODY MASS INDEX: 29.12 KG/M2 | RESPIRATION RATE: 16 BRPM | SYSTOLIC BLOOD PRESSURE: 128 MMHG | HEART RATE: 68 BPM | WEIGHT: 215 LBS | DIASTOLIC BLOOD PRESSURE: 78 MMHG | HEIGHT: 72 IN

## 2023-04-19 DIAGNOSIS — G51.8 FACIAL NERVE MOTOR DISORDER: Primary | ICD-10-CM

## 2023-04-19 DIAGNOSIS — G51.31 HEMIFACIAL SPASM OF RIGHT SIDE OF FACE: ICD-10-CM

## 2023-04-19 PROCEDURE — 64612 DESTROY NERVE FACE MUSCLE: CPT | Performed by: PSYCHIATRY & NEUROLOGY

## 2023-04-19 PROCEDURE — 95874 GUIDE NERV DESTR NEEDLE EMG: CPT | Performed by: PSYCHIATRY & NEUROLOGY

## 2023-04-19 NOTE — PROGRESS NOTES
Chief Complaint   Patient presents with    Procedure     botox     Visit Vitals  /78 (BP 1 Location: Left upper arm, BP Patient Position: Sitting)   Pulse 68   Resp 16   Ht 6' (1.829 m)   Wt 215 lb (97.5 kg)   SpO2 99%   BMI 29.16 kg/m²

## 2023-11-01 ENCOUNTER — PROCEDURE VISIT (OUTPATIENT)
Age: 48
End: 2023-11-01
Payer: COMMERCIAL

## 2023-11-01 VITALS
HEIGHT: 72 IN | DIASTOLIC BLOOD PRESSURE: 72 MMHG | HEART RATE: 55 BPM | RESPIRATION RATE: 16 BRPM | OXYGEN SATURATION: 96 % | SYSTOLIC BLOOD PRESSURE: 118 MMHG | BODY MASS INDEX: 28.44 KG/M2 | WEIGHT: 210 LBS

## 2023-11-01 DIAGNOSIS — G51.31 CLONIC HEMIFACIAL SPASM, RIGHT: ICD-10-CM

## 2023-11-01 DIAGNOSIS — G51.8 OTHER DISORDERS OF FACIAL NERVE: Primary | ICD-10-CM

## 2023-11-01 PROCEDURE — 64612 DESTROY NERVE FACE MUSCLE: CPT | Performed by: PSYCHIATRY & NEUROLOGY

## 2023-11-01 PROCEDURE — 95874 GUIDE NERV DESTR NEEDLE EMG: CPT | Performed by: PSYCHIATRY & NEUROLOGY

## 2024-01-12 ENCOUNTER — TELEPHONE (OUTPATIENT)
Age: 49
End: 2024-01-12

## 2024-01-12 NOTE — TELEPHONE ENCOUNTER
Left message to get patient's updated insurance information. Asked patient to call back or upload it to [employer]

## 2024-01-15 NOTE — TELEPHONE ENCOUNTER
Re: Botox    Pt has [employer] plan for 2024 year, per Botox PA rep no PA I required under the new plan for  or [id number], sent update to nurse to use a buy and bill vial.

## 2024-01-25 ENCOUNTER — TELEPHONE (OUTPATIENT)
Age: 49
End: 2024-01-25

## 2024-01-25 NOTE — TELEPHONE ENCOUNTER
Patient requesting to schedule Botox . Preferably 8:00 am appointment time. Stated to  ask for Sussy Meraz at [phone].    Please contact

## 2024-01-26 NOTE — TELEPHONE ENCOUNTER
Called patient. Informed him I can put him on for next Wednesday at 8:30AM but we can switch him back to 8AM for his next appointment.

## 2024-01-31 ENCOUNTER — PROCEDURE VISIT (OUTPATIENT)
Age: 49
End: 2024-01-31

## 2024-01-31 VITALS
OXYGEN SATURATION: 99 % | HEART RATE: 76 BPM | DIASTOLIC BLOOD PRESSURE: 76 MMHG | SYSTOLIC BLOOD PRESSURE: 122 MMHG | HEIGHT: 72 IN | RESPIRATION RATE: 16 BRPM | WEIGHT: 210 LBS | BODY MASS INDEX: 28.44 KG/M2

## 2024-01-31 DIAGNOSIS — G51.8 OTHER DISORDERS OF FACIAL NERVE: Primary | ICD-10-CM

## 2024-01-31 DIAGNOSIS — G51.31 CLONIC HEMIFACIAL SPASM, RIGHT: ICD-10-CM

## 2024-01-31 NOTE — PROGRESS NOTES
Botox Injection Note           Indication: Hemifacial spasm and blepharospasm.     Botox injections continue to provide significant relief from muscle twitching in his face.      Procedure:   Botox concentration: 100 units in 2 ml of preservative-free normal saline.   Following muscles were injected under EMG guidance:      1. R Orbicularis oculi, palpebral part, 2.5 units each in 6 sites (including outer lateral and above brow medially): Total 15 units  2. R Levator labi superioris alaque nasi: 2.5 units  3. R Zygomaticus: Total 2.5 units  4. R mentalis: Total 5 units  5.  Right risorius, total 2.5 units  6.  Right inferior orbicularis oris: Total 2.5 units          30 units injected as above.       Patient tolerated procedure well.

## 2024-04-17 ENCOUNTER — TELEPHONE (OUTPATIENT)
Age: 49
End: 2024-04-17

## 2024-04-17 NOTE — TELEPHONE ENCOUNTER
Patient is asking why he was billed for two procedures when he only had one procedure, which was done on 01.31.24? He has called billing and billing told him to call us.     Pls call him [phone]

## 2024-04-22 ENCOUNTER — PATIENT MESSAGE (OUTPATIENT)
Age: 49
End: 2024-04-22

## 2024-04-24 ENCOUNTER — PROCEDURE VISIT (OUTPATIENT)
Age: 49
End: 2024-04-24
Payer: COMMERCIAL

## 2024-04-24 VITALS
HEIGHT: 72 IN | OXYGEN SATURATION: 98 % | HEART RATE: 59 BPM | SYSTOLIC BLOOD PRESSURE: 122 MMHG | DIASTOLIC BLOOD PRESSURE: 62 MMHG | WEIGHT: 210 LBS | RESPIRATION RATE: 16 BRPM | BODY MASS INDEX: 28.44 KG/M2

## 2024-04-24 DIAGNOSIS — G51.31 CLONIC HEMIFACIAL SPASM, RIGHT: ICD-10-CM

## 2024-04-24 DIAGNOSIS — G51.8 OTHER DISORDERS OF FACIAL NERVE: Primary | ICD-10-CM

## 2024-04-24 PROCEDURE — 95874 GUIDE NERV DESTR NEEDLE EMG: CPT | Performed by: PSYCHIATRY & NEUROLOGY

## 2024-04-24 PROCEDURE — 64612 DESTROY NERVE FACE MUSCLE: CPT | Performed by: PSYCHIATRY & NEUROLOGY

## 2024-04-24 NOTE — PROGRESS NOTES
Botox Injection Note           Indication: Hemifacial spasm and blepharospasm.     Botox injections continue to provide significant relief from muscle twitching in his face.      Procedure:   Botox concentration: 100 units in 2 ml of preservative-free normal saline.     Following muscles were injected under EMG guidance:      1. R Orbicularis oculi, palpebral part, 2.5 units each in 5 sites (including outer lateral and above brow medially): Total 12.5 units  2. R Levator labi superioris alaque nasi: 2.5 units  3. R Zygomaticus: Total 2.5 units  4. R mentalis: Total 5 units    Risorius and orbicularis oris, inferior part was not injected during the session       22.5 units of Botox were reconstituted and injected as above      Patient tolerated procedure well.

## 2024-10-02 ENCOUNTER — TELEPHONE (OUTPATIENT)
Age: 49
End: 2024-10-02

## 2024-10-02 NOTE — TELEPHONE ENCOUNTER
Patient's  is calling from his office phone to have the patient scheduled for a Botox injection.    Please call back to schedule.

## 2024-12-11 ENCOUNTER — PROCEDURE VISIT (OUTPATIENT)
Age: 49
End: 2024-12-11

## 2024-12-11 VITALS
DIASTOLIC BLOOD PRESSURE: 64 MMHG | RESPIRATION RATE: 16 BRPM | WEIGHT: 215 LBS | OXYGEN SATURATION: 98 % | BODY MASS INDEX: 29.12 KG/M2 | SYSTOLIC BLOOD PRESSURE: 118 MMHG | HEIGHT: 72 IN | HEART RATE: 73 BPM

## 2024-12-11 DIAGNOSIS — G51.8 OTHER DISORDERS OF FACIAL NERVE: Primary | ICD-10-CM

## 2024-12-11 DIAGNOSIS — G51.31 CLONIC HEMIFACIAL SPASM, RIGHT: ICD-10-CM

## 2024-12-11 PROCEDURE — 95874 GUIDE NERV DESTR NEEDLE EMG: CPT | Performed by: PSYCHIATRY & NEUROLOGY

## 2024-12-11 PROCEDURE — 64612 DESTROY NERVE FACE MUSCLE: CPT | Performed by: PSYCHIATRY & NEUROLOGY

## 2024-12-11 NOTE — PROGRESS NOTES
Botox Injection Note           Indication: Hemifacial spasm and blepharospasm.     Botox injections continue to provide significant relief from muscle twitching in his face.      Procedure:   Botox concentration: 100 units in 2 ml of preservative-free normal saline.     Following muscles were injected under EMG guidance:      1. R Orbicularis oculi, palpebral part, 2.5 units each in 6 sites (including outer lateral and above brow medially): Total 12.5 units  2. R Levator labi superioris alaque nasi: 2.5 units  3. R Zygomaticus: Total 2.5 units  4. R mentalis: Total 5 units    Risorius and orbicularis oris, inferior part was not injected during the session       25 units of Botox were reconstituted and injected as above      Patient tolerated procedure well.

## 2025-02-06 ENCOUNTER — TELEPHONE (OUTPATIENT)
Age: 50
End: 2025-02-06

## 2025-02-06 NOTE — TELEPHONE ENCOUNTER
Botox Drug Acquisition:   Drug:   Dx:  Insurance: [employer] [place]  Submission Type:  HCPCS:  CPT:  Reference #:  Approval Range: PA PENDING FOLLOW UP    02/06/25: 2025 Benefits sent, pt has not been seen for follow up in years, only procedures, sent message for follow up appt for supporting notes for PA.

## 2025-03-05 ENCOUNTER — OFFICE VISIT (OUTPATIENT)
Age: 50
End: 2025-03-05
Payer: COMMERCIAL

## 2025-03-05 VITALS — BODY MASS INDEX: 29.12 KG/M2 | HEIGHT: 72 IN | WEIGHT: 215 LBS | RESPIRATION RATE: 16 BRPM

## 2025-03-05 DIAGNOSIS — G51.31 CLONIC HEMIFACIAL SPASM, RIGHT: Primary | ICD-10-CM

## 2025-03-05 DIAGNOSIS — G51.8 OTHER DISORDERS OF FACIAL NERVE: ICD-10-CM

## 2025-03-05 PROCEDURE — 99213 OFFICE O/P EST LOW 20 MIN: CPT | Performed by: PSYCHIATRY & NEUROLOGY

## 2025-03-05 ASSESSMENT — PATIENT HEALTH QUESTIONNAIRE - PHQ9
SUM OF ALL RESPONSES TO PHQ QUESTIONS 1-9: 0
1. LITTLE INTEREST OR PLEASURE IN DOING THINGS: NOT AT ALL
SUM OF ALL RESPONSES TO PHQ QUESTIONS 1-9: 0
2. FEELING DOWN, DEPRESSED OR HOPELESS: NOT AT ALL

## 2025-03-05 NOTE — PROGRESS NOTES
SUBJECTIVE  Bradly Delatorre is a 49 y.o. male who came in for a follow-up regarding hemifacial spasms on the right.  He has been doing botulinum toxin injections for over 5 years now and has had consistent relief every time.  The spasms are suppressed significantly after an injection which has improved his quality of life.  He would like to continue.    He has tried muscle relaxers including cyclobenzaprine, baclofen previously and these did not provide any relief.      EXAM  Exam:  Resp 16   Ht 1.829 m (6')   Wt 97.5 kg (215 lb)   BMI 29.16 kg/m²   Gen:  CV: RRR  Lungs: non labored breathing  Abd: non distending  Neuro: A&O x 3, no dysarthria or aphasia  CN II-XII: PERRL, EOMI, face symmetric, tongue/palate midline.  Subtle spasms noted in the right cheek  Motor: strength 5/5 all four ext  Sensory: intact to LT  Gait: normal    LABS/ IMAGING  Lab Results   Component Value Date    WBC 4.9 05/18/2021    HGB 15.1 05/18/2021    HCT 46.9 05/18/2021    MCV 91.4 05/18/2021     05/18/2021     Lab Results   Component Value Date     05/18/2021    K 5.2 (H) 05/18/2021     05/18/2021    CO2 29 05/18/2021    BUN 19 05/18/2021    CREATININE 0.81 05/18/2021    GLUCOSE 106 (H) 05/18/2021    CALCIUM 9.3 05/18/2021    BILITOT 0.5 05/18/2021    ALKPHOS 57 05/18/2021    AST 15 05/18/2021    ALT 27 05/18/2021    GFRAA >60 05/18/2021    AGRATIO 1.4 05/18/2021    GLOB 2.9 05/18/2021         ASSESSMENT      Diagnosis Orders   1. Clonic hemifacial spasm, right  onabotulinumtoxinA (BOTOX) 100 units injection      2. Other disorders of facial nerve  onabotulinumtoxinA (BOTOX) 100 units injection            PLAN   49-year-old male with longstanding history of idiopathic right-sided hemifacial spasm and blepharospasm that has been responding quite well to botulinum toxin injections  Botox has significantly improved his quality of life as he is not having much discomfort and is able to keep his eye open  We will continue

## 2025-03-26 ENCOUNTER — PROCEDURE VISIT (OUTPATIENT)
Age: 50
End: 2025-03-26

## 2025-03-26 VITALS
BODY MASS INDEX: 29.12 KG/M2 | HEART RATE: 62 BPM | SYSTOLIC BLOOD PRESSURE: 128 MMHG | WEIGHT: 215 LBS | OXYGEN SATURATION: 98 % | RESPIRATION RATE: 16 BRPM | DIASTOLIC BLOOD PRESSURE: 78 MMHG | HEIGHT: 72 IN

## 2025-03-26 DIAGNOSIS — G51.8 OTHER DISORDERS OF FACIAL NERVE: ICD-10-CM

## 2025-03-26 DIAGNOSIS — G51.31 CLONIC HEMIFACIAL SPASM, RIGHT: Primary | ICD-10-CM

## 2025-03-26 NOTE — PROGRESS NOTES
Botox Injection Note           Indication: Hemifacial spasm and blepharospasm.     Botox injections continue to provide significant relief from muscle twitching in his face.      Procedure:   Botox concentration: 100 units in 2 ml of preservative-free normal saline.     Following muscles were injected under EMG guidance:      1. R Orbicularis oculi, palpebral part, 2.5 units each in 5 sites (including outer lateral and above brow medially): Total 12.5 units  2. R Levator labi superioris alaque nasi: 2.5 units  3. R Zygomaticus: Total 2.5 units  4. R mentalis: Total 5 units  5.  Right risorius, total 2.5 units  6.  Right inferior orbicularis oris: Total 2.5 units          27.5 units injected as above.       Patient tolerated procedure well.     Nisl Padilla MD